# Patient Record
Sex: FEMALE | NOT HISPANIC OR LATINO | Employment: OTHER | URBAN - METROPOLITAN AREA
[De-identification: names, ages, dates, MRNs, and addresses within clinical notes are randomized per-mention and may not be internally consistent; named-entity substitution may affect disease eponyms.]

---

## 2023-10-11 ENCOUNTER — TELEPHONE (OUTPATIENT)
Dept: SURGERY | Facility: CLINIC | Age: 68
End: 2023-10-11
Payer: COMMERCIAL

## 2023-10-25 ENCOUNTER — LAB (OUTPATIENT)
Dept: LAB | Facility: LAB | Age: 68
End: 2023-10-25
Payer: COMMERCIAL

## 2023-10-25 ENCOUNTER — OFFICE VISIT (OUTPATIENT)
Dept: SURGERY | Facility: CLINIC | Age: 68
End: 2023-10-25
Payer: COMMERCIAL

## 2023-10-25 ENCOUNTER — CLINICAL SUPPORT (OUTPATIENT)
Dept: SURGERY | Facility: CLINIC | Age: 68
End: 2023-10-25
Payer: COMMERCIAL

## 2023-10-25 VITALS
HEART RATE: 81 BPM | HEIGHT: 58 IN | SYSTOLIC BLOOD PRESSURE: 112 MMHG | DIASTOLIC BLOOD PRESSURE: 66 MMHG | BODY MASS INDEX: 61.5 KG/M2 | WEIGHT: 293 LBS | RESPIRATION RATE: 16 BRPM

## 2023-10-25 DIAGNOSIS — E78.5 HYPERLIPIDEMIA, UNSPECIFIED: ICD-10-CM

## 2023-10-25 DIAGNOSIS — R79.1 ABNORMAL COAGULATION PROFILE: ICD-10-CM

## 2023-10-25 DIAGNOSIS — E07.9 DISORDER OF THYROID, UNSPECIFIED: ICD-10-CM

## 2023-10-25 DIAGNOSIS — E53.8 DEFICIENCY OF OTHER SPECIFIED B GROUP VITAMINS: Primary | ICD-10-CM

## 2023-10-25 DIAGNOSIS — E61.1 IRON DEFICIENCY: ICD-10-CM

## 2023-10-25 DIAGNOSIS — E55.9 VITAMIN D DEFICIENCY, UNSPECIFIED: ICD-10-CM

## 2023-10-25 DIAGNOSIS — E78.2 HYPERLIPIDEMIA, MIXED: ICD-10-CM

## 2023-10-25 DIAGNOSIS — G47.33 OBSTRUCTIVE SLEEP APNEA: ICD-10-CM

## 2023-10-25 DIAGNOSIS — E66.01 MORBID OBESITY DUE TO EXCESS CALORIES (MULTI): ICD-10-CM

## 2023-10-25 DIAGNOSIS — E11.9 TYPE 2 DIABETES MELLITUS WITHOUT COMPLICATION, WITHOUT LONG-TERM CURRENT USE OF INSULIN (MULTI): ICD-10-CM

## 2023-10-25 DIAGNOSIS — E63.9 NUTRITIONAL DEFICIENCY, UNSPECIFIED: ICD-10-CM

## 2023-10-25 DIAGNOSIS — I10 PRIMARY HYPERTENSION: Primary | ICD-10-CM

## 2023-10-25 LAB
25(OH)D3 SERPL-MCNC: 35 NG/ML (ref 31–100)
ALBUMIN SERPL-MCNC: 4 G/DL (ref 3.5–5)
ALP BLD-CCNC: 98 U/L (ref 35–125)
ALT SERPL-CCNC: 15 U/L (ref 5–40)
ANION GAP SERPL CALC-SCNC: 17 MMOL/L
APTT PPP: 30.1 SECONDS (ref 22–32.5)
AST SERPL-CCNC: 16 U/L (ref 5–40)
BASOPHILS # BLD AUTO: 0.09 X10*3/UL (ref 0–0.1)
BASOPHILS NFR BLD AUTO: 0.9 %
BILIRUB SERPL-MCNC: 0.4 MG/DL (ref 0.1–1.2)
BUN SERPL-MCNC: 14 MG/DL (ref 8–25)
CALCIUM SERPL-MCNC: 9.4 MG/DL (ref 8.5–10.4)
CHLORIDE SERPL-SCNC: 98 MMOL/L (ref 97–107)
CHOLEST SERPL-MCNC: 137 MG/DL (ref 133–200)
CHOLEST/HDLC SERPL: 2.4 {RATIO}
CO2 SERPL-SCNC: 24 MMOL/L (ref 24–31)
CREAT SERPL-MCNC: 0.8 MG/DL (ref 0.4–1.6)
EOSINOPHIL # BLD AUTO: 0.17 X10*3/UL (ref 0–0.7)
EOSINOPHIL NFR BLD AUTO: 1.7 %
ERYTHROCYTE [DISTWIDTH] IN BLOOD BY AUTOMATED COUNT: 13.3 % (ref 11.5–14.5)
FERRITIN SERPL-MCNC: 93 NG/ML (ref 13–150)
FOLATE SERPL-MCNC: >20 NG/ML (ref 4.2–19.9)
GFR SERPL CREATININE-BSD FRML MDRD: 82 ML/MIN/1.73M*2
GLUCOSE SERPL-MCNC: 198 MG/DL (ref 65–99)
HCT VFR BLD AUTO: 40.9 % (ref 36–46)
HDLC SERPL-MCNC: 56 MG/DL
HGB BLD-MCNC: 13.2 G/DL (ref 12–16)
IMM GRANULOCYTES # BLD AUTO: 0.04 X10*3/UL (ref 0–0.7)
IMM GRANULOCYTES NFR BLD AUTO: 0.4 % (ref 0–0.9)
INR PPP: 1.1 (ref 0.9–1.2)
IRON SATN MFR SERPL: 31 % (ref 12–50)
IRON SERPL-MCNC: 93 UG/DL (ref 30–160)
LDLC SERPL CALC-MCNC: 46 MG/DL (ref 65–130)
LYMPHOCYTES # BLD AUTO: 2.15 X10*3/UL (ref 1.2–4.8)
LYMPHOCYTES NFR BLD AUTO: 22.1 %
MCH RBC QN AUTO: 29.4 PG (ref 26–34)
MCHC RBC AUTO-ENTMCNC: 32.3 G/DL (ref 32–36)
MCV RBC AUTO: 91 FL (ref 80–100)
MONOCYTES # BLD AUTO: 0.59 X10*3/UL (ref 0.1–1)
MONOCYTES NFR BLD AUTO: 6.1 %
NEUTROPHILS # BLD AUTO: 6.69 X10*3/UL (ref 1.2–7.7)
NEUTROPHILS NFR BLD AUTO: 68.8 %
NRBC BLD-RTO: 0 /100 WBCS (ref 0–0)
PLATELET # BLD AUTO: 309 X10*3/UL (ref 150–450)
PMV BLD AUTO: 9.7 FL (ref 7.5–11.5)
POTASSIUM SERPL-SCNC: 4 MMOL/L (ref 3.4–5.1)
PROT SERPL-MCNC: 6.7 G/DL (ref 5.9–7.9)
PROTHROMBIN TIME: 11.2 SECONDS (ref 9.3–12.7)
PTH-INTACT SERPL-MCNC: 22.6 PG/ML (ref 18.5–88)
RBC # BLD AUTO: 4.49 X10*6/UL (ref 4–5.2)
SODIUM SERPL-SCNC: 139 MMOL/L (ref 133–145)
TIBC SERPL-MCNC: 300 UG/DL (ref 228–428)
TRIGL SERPL-MCNC: 177 MG/DL (ref 40–150)
TSH SERPL DL<=0.05 MIU/L-ACNC: 1.53 MIU/L (ref 0.27–4.2)
UIBC SERPL-MCNC: 207 UG/DL (ref 110–370)
VIT B12 SERPL-MCNC: 487 PG/ML (ref 211–946)
WBC # BLD AUTO: 9.7 X10*3/UL (ref 4.4–11.3)

## 2023-10-25 PROCEDURE — 80061 LIPID PANEL: CPT

## 2023-10-25 PROCEDURE — 82746 ASSAY OF FOLIC ACID SERUM: CPT

## 2023-10-25 PROCEDURE — 80053 COMPREHEN METABOLIC PANEL: CPT

## 2023-10-25 PROCEDURE — 84443 ASSAY THYROID STIM HORMONE: CPT

## 2023-10-25 PROCEDURE — 1036F TOBACCO NON-USER: CPT | Performed by: INTERNAL MEDICINE

## 2023-10-25 PROCEDURE — 82728 ASSAY OF FERRITIN: CPT

## 2023-10-25 PROCEDURE — 3074F SYST BP LT 130 MM HG: CPT | Performed by: INTERNAL MEDICINE

## 2023-10-25 PROCEDURE — 83970 ASSAY OF PARATHORMONE: CPT

## 2023-10-25 PROCEDURE — 85610 PROTHROMBIN TIME: CPT

## 2023-10-25 PROCEDURE — 82306 VITAMIN D 25 HYDROXY: CPT

## 2023-10-25 PROCEDURE — 83540 ASSAY OF IRON: CPT

## 2023-10-25 PROCEDURE — 36415 COLL VENOUS BLD VENIPUNCTURE: CPT

## 2023-10-25 PROCEDURE — 84446 ASSAY OF VITAMIN E: CPT

## 2023-10-25 PROCEDURE — 1125F AMNT PAIN NOTED PAIN PRSNT: CPT | Performed by: INTERNAL MEDICINE

## 2023-10-25 PROCEDURE — 3048F LDL-C <100 MG/DL: CPT | Performed by: INTERNAL MEDICINE

## 2023-10-25 PROCEDURE — 83550 IRON BINDING TEST: CPT

## 2023-10-25 PROCEDURE — 82525 ASSAY OF COPPER: CPT

## 2023-10-25 PROCEDURE — 1159F MED LIST DOCD IN RCRD: CPT | Performed by: INTERNAL MEDICINE

## 2023-10-25 PROCEDURE — 84630 ASSAY OF ZINC: CPT

## 2023-10-25 PROCEDURE — 3078F DIAST BP <80 MM HG: CPT | Performed by: INTERNAL MEDICINE

## 2023-10-25 PROCEDURE — 82607 VITAMIN B-12: CPT

## 2023-10-25 PROCEDURE — 84425 ASSAY OF VITAMIN B-1: CPT

## 2023-10-25 PROCEDURE — 85025 COMPLETE CBC W/AUTO DIFF WBC: CPT

## 2023-10-25 PROCEDURE — 4010F ACE/ARB THERAPY RXD/TAKEN: CPT | Performed by: INTERNAL MEDICINE

## 2023-10-25 PROCEDURE — 99213 OFFICE O/P EST LOW 20 MIN: CPT | Performed by: INTERNAL MEDICINE

## 2023-10-25 PROCEDURE — 85730 THROMBOPLASTIN TIME PARTIAL: CPT

## 2023-10-25 RX ORDER — LOSARTAN POTASSIUM 100 MG/1
100 TABLET ORAL
COMMUNITY
Start: 2023-02-01

## 2023-10-25 RX ORDER — MONTELUKAST SODIUM 10 MG/1
10 TABLET ORAL NIGHTLY
COMMUNITY
Start: 2023-02-01

## 2023-10-25 RX ORDER — OMEPRAZOLE 40 MG/1
40 CAPSULE, DELAYED RELEASE ORAL EVERY 24 HOURS
COMMUNITY

## 2023-10-25 RX ORDER — CYCLOBENZAPRINE HCL 10 MG
1 TABLET ORAL 3 TIMES DAILY PRN
COMMUNITY
Start: 2023-08-03

## 2023-10-25 RX ORDER — DULOXETIN HYDROCHLORIDE 60 MG/1
60 CAPSULE, DELAYED RELEASE ORAL
COMMUNITY
Start: 2023-02-01

## 2023-10-25 RX ORDER — NAPROXEN SODIUM 220 MG/1
81 TABLET, FILM COATED ORAL EVERY 24 HOURS
COMMUNITY
End: 2024-03-21 | Stop reason: HOSPADM

## 2023-10-25 RX ORDER — VIT C/E/ZN/COPPR/LUTEIN/ZEAXAN 250MG-90MG
1000 CAPSULE ORAL
COMMUNITY
End: 2024-03-21 | Stop reason: HOSPADM

## 2023-10-25 RX ORDER — ATORVASTATIN CALCIUM 10 MG/1
1 TABLET, FILM COATED ORAL NIGHTLY
COMMUNITY
Start: 2023-02-01

## 2023-10-25 RX ORDER — FLUOXETINE HYDROCHLORIDE 20 MG/1
20 CAPSULE ORAL
COMMUNITY
Start: 2023-03-21 | End: 2024-03-21 | Stop reason: HOSPADM

## 2023-10-25 RX ORDER — CALCIUM CARBONATE 500(1250)
1 TABLET ORAL
COMMUNITY
End: 2024-03-21 | Stop reason: HOSPADM

## 2023-10-25 RX ORDER — METFORMIN HYDROCHLORIDE 500 MG/1
1 TABLET ORAL
COMMUNITY
Start: 2023-02-01

## 2023-10-25 RX ORDER — ALBUTEROL SULFATE 90 UG/1
2 AEROSOL, METERED RESPIRATORY (INHALATION) EVERY 6 HOURS PRN
COMMUNITY
Start: 2023-02-01

## 2023-10-25 ASSESSMENT — ENCOUNTER SYMPTOMS
ARTHRALGIAS: 0
DIARRHEA: 0
NAUSEA: 0
LOSS OF SENSATION IN FEET: 0
DEPRESSION: 0
CONSTIPATION: 0
DIZZINESS: 0
ABDOMINAL PAIN: 0
OCCASIONAL FEELINGS OF UNSTEADINESS: 0
PALPITATIONS: 0
COUGH: 0
SHORTNESS OF BREATH: 0

## 2023-10-25 ASSESSMENT — PATIENT HEALTH QUESTIONNAIRE - PHQ9
2. FEELING DOWN, DEPRESSED OR HOPELESS: NOT AT ALL
1. LITTLE INTEREST OR PLEASURE IN DOING THINGS: NOT AT ALL
SUM OF ALL RESPONSES TO PHQ9 QUESTIONS 1 AND 2: 0

## 2023-10-25 ASSESSMENT — PAIN SCALES - GENERAL: PAINLEVEL: 7

## 2023-10-25 NOTE — PROGRESS NOTES
Medical Weight Loss Appointment ([facility] 3/3)     Current Weight: 311lb  Current BMI: 64.44  20 lb wt loss goal (300lb)  9 lb. wt loss this past month     Current Diet: partially adhering to lifelong rules.   Adherence: good  Daily Intake: 2-3 meals/day.   Breakfast (@10 am)- 1-2 scrambled eggs with low-fat cheese.   Pt reports sometimes being too full from breakfast and then wanting to skip lunch.   Lunch (@3pm)- turkey sandwich with a little bit of mustard, OR [employer] broccoli cheddar soup, OR ratio protein greek yogurt, OR skips (1-2x/week).   Dinner- grill chicken and coats beans, OR 1 pork chop with mixed vegetables, OR lean cut steak with vegetables   Snacks: bowl of cereal 1x this past month  Beverages: water, 2 cups of coffee/day with 2% milk or chocolate creamer   Exercise: Chair exercises 2-3x/day, everyday   Behavior/Diet Changes:  - Stopped snacking at night  - Decreased starch intake  - Decreased cereal intake   - Tried protein shake/ sf creamer in coffee: pt reports she did not like it.     Today's Lesson: Reviewed patient's dietary changes and food recall. Reviewed milk alternatives such as choosing skim or 1%. Reviewed low-sugar suggestions for coffee including Fairlife fat-free milk or tbsp of Nestle cocoa mix.   Diet Goals: Practice the lifelong rules  Exercise Recommendations: Gradually work up to 150 minutes of moderate intensity exercise per week.  Behavior Goals:   1. Decrease breakfast to 1 egg and 1/4 cup low-fat cheese OR 2 eggs, no cheese to prevent overeating.   2. Incorporate lunch consistently.   3. Continue to decrease creamer use. Try low-fat milk and/or sugar-free cocoa mix.     Plan: dietary clearance not provided today. Pt will fuv with dietitian next month.     Keiry Herndon, RD, LDN  Registered Dietitian, Licensed Dietitian Nutritionist

## 2023-10-25 NOTE — PROGRESS NOTES
"Subjective   Patient ID: Nisa Guzman is a 67 y.o. female who presents for medical weight loss visit. Currently has 3 meals a day, protein with each meal. Does not snack. Does not drink sugary beverages. Regarding exercise, she completes chair exercises 3x a day. She is limited due to her hip pain. She has an appointment with the cardiologist next week. Her insurance did not cover a CPAP and patient does not think she would tolerate one. Agreed to use hospital APAP and to sleep in recliner after surgery.    Diagnostics Reviewed: 10/2023 sleep study showed mild sleep apnea.   Labs Reviewed:         Review of Systems   Respiratory:  Negative for cough and shortness of breath.    Cardiovascular:  Negative for chest pain and palpitations.   Gastrointestinal:  Negative for abdominal pain, constipation, diarrhea and nausea.   Musculoskeletal:  Negative for arthralgias.   Neurological:  Negative for dizziness.       Objective   /66   Pulse 81   Resp 16   Ht 1.48 m (4' 10.25\")   Wt 141 kg (311 lb)   BMI 64.44 kg/m²     Physical Exam  Constitutional:       Appearance: She is obese.   HENT:      Mouth/Throat:      Comments: Dentition ok  Cardiovascular:      Rate and Rhythm: Normal rate and regular rhythm.   Pulmonary:      Breath sounds: Normal breath sounds.   Abdominal:      General: Bowel sounds are normal.      Palpations: Abdomen is soft.   Musculoskeletal:         General: No swelling.   Neurological:      Mental Status: She is alert.         Assessment/Plan   Diagnoses and all orders for this visit:  Primary hypertension  Hyperlipidemia, mixed  Obstructive sleep apnea  Type 2 diabetes mellitus without complication, without long-term current use of insulin (CMS/[facility])  Morbid obesity due to excess calories (CMS/[facility])        Scribe Attestation  By signing my name below, Elaine CEDEÑO, Scrpaul attest that this documentation has been prepared under the direction and in the presence of Haven Alejandro, " MD.

## 2023-10-27 ENCOUNTER — TELEPHONE (OUTPATIENT)
Dept: SURGERY | Facility: CLINIC | Age: 68
End: 2023-10-27
Payer: COMMERCIAL

## 2023-10-27 LAB
COPPER SERPL-MCNC: 121.8 UG/DL (ref 80–155)
ZINC SERPL-MCNC: 87.3 UG/DL (ref 60–120)

## 2023-10-28 LAB
A-TOCOPHEROL VIT E SERPL-MCNC: 23.7 MG/L (ref 5.5–18)
BETA+GAMMA TOCOPHEROL SERPL-MCNC: 0.3 MG/L (ref 0–6)

## 2023-10-29 LAB — VIT B1 PYROPHOSHATE BLD-SCNC: 251 NMOL/L (ref 70–180)

## 2023-10-31 ENCOUNTER — OFFICE VISIT (OUTPATIENT)
Dept: CARDIOLOGY | Facility: CLINIC | Age: 68
End: 2023-10-31
Payer: COMMERCIAL

## 2023-10-31 VITALS
WEIGHT: 293 LBS | SYSTOLIC BLOOD PRESSURE: 164 MMHG | OXYGEN SATURATION: 98 % | BODY MASS INDEX: 65.27 KG/M2 | HEART RATE: 107 BPM | DIASTOLIC BLOOD PRESSURE: 88 MMHG

## 2023-10-31 DIAGNOSIS — I10 ESSENTIAL HYPERTENSION: ICD-10-CM

## 2023-10-31 DIAGNOSIS — Z01.810 PREOP CARDIOVASCULAR EXAM: Primary | ICD-10-CM

## 2023-10-31 DIAGNOSIS — E78.49 OTHER HYPERLIPIDEMIA: ICD-10-CM

## 2023-10-31 DIAGNOSIS — Z01.818 PRE-OPERATIVE CLEARANCE: ICD-10-CM

## 2023-10-31 PROCEDURE — 1036F TOBACCO NON-USER: CPT | Performed by: INTERNAL MEDICINE

## 2023-10-31 PROCEDURE — 1125F AMNT PAIN NOTED PAIN PRSNT: CPT | Performed by: INTERNAL MEDICINE

## 2023-10-31 PROCEDURE — 3077F SYST BP >= 140 MM HG: CPT | Performed by: INTERNAL MEDICINE

## 2023-10-31 PROCEDURE — 93010 ELECTROCARDIOGRAM REPORT: CPT | Performed by: INTERNAL MEDICINE

## 2023-10-31 PROCEDURE — 3079F DIAST BP 80-89 MM HG: CPT | Performed by: INTERNAL MEDICINE

## 2023-10-31 PROCEDURE — 99204 OFFICE O/P NEW MOD 45 MIN: CPT | Performed by: INTERNAL MEDICINE

## 2023-10-31 PROCEDURE — 1159F MED LIST DOCD IN RCRD: CPT | Performed by: INTERNAL MEDICINE

## 2023-10-31 PROCEDURE — 99214 OFFICE O/P EST MOD 30 MIN: CPT | Performed by: INTERNAL MEDICINE

## 2023-10-31 PROCEDURE — 93005 ELECTROCARDIOGRAM TRACING: CPT | Performed by: INTERNAL MEDICINE

## 2023-10-31 RX ORDER — AMLODIPINE BESYLATE 5 MG/1
5 TABLET ORAL DAILY
Qty: 30 TABLET | Refills: 11 | Status: SHIPPED | OUTPATIENT
Start: 2023-10-31 | End: 2024-01-23 | Stop reason: DRUGHIGH

## 2023-10-31 ASSESSMENT — ENCOUNTER SYMPTOMS
WEAKNESS: 0
LOSS OF SENSATION IN FEET: 0
OCCASIONAL FEELINGS OF UNSTEADINESS: 0
SYNCOPE: 0
MYALGIAS: 0
CLAUDICATION: 0
DEPRESSION: 0
COUGH: 0
PND: 0
DIZZINESS: 0
DIAPHORESIS: 0
WEIGHT GAIN: 0
DYSPNEA ON EXERTION: 0
FEVER: 0
WHEEZING: 0
ORTHOPNEA: 0
WEIGHT LOSS: 0
NEAR-SYNCOPE: 0
SHORTNESS OF BREATH: 0
PALPITATIONS: 0
IRREGULAR HEARTBEAT: 0

## 2023-10-31 ASSESSMENT — PAIN SCALES - GENERAL: PAINLEVEL: 7

## 2023-10-31 NOTE — ASSESSMENT & PLAN NOTE
Uncontrolled. Adding Norvasc. Will see in 3m to reassess. Encouraged to check at home 2-3x/week. Discussed lifestyle modifications. Discussed the DASH diet

## 2023-10-31 NOTE — PROGRESS NOTES
Subjective      Chief Complaint   Patient presents with    Pre-op Clearance     Bariatric surgery clearance        67-year-old female with history of hypertension, hyperlipidemia, non-insulin-dependent type 2 diabetes mellitus and morbid obesity presents for preoperative cardiac restratification for bariatric surgery. She has seen Dr Graham Wang and Dr Alejandro three times. She is currently active in the weight loss program. She walks up and down her driveway but is limited by her back pain. She has been doing chair exercises without limitation.          Review of Systems   Constitutional: Negative for diaphoresis, fever, weight gain and weight loss.   Eyes:  Negative for visual disturbance.   Cardiovascular:  Negative for chest pain, claudication, dyspnea on exertion, irregular heartbeat, leg swelling, near-syncope, orthopnea, palpitations, paroxysmal nocturnal dyspnea and syncope.   Respiratory:  Negative for cough, shortness of breath and wheezing.    Musculoskeletal:  Negative for muscle weakness and myalgias.   Neurological:  Negative for dizziness and weakness.   All other systems reviewed and are negative.       Past Medical History:   Diagnosis Date    Hyperlipidemia     Hypertension     Sleep apnea         History reviewed. No pertinent surgical history.     Social History     Socioeconomic History    Marital status:      Spouse name: Not on file    Number of children: Not on file    Years of education: Not on file    Highest education level: Not on file   Occupational History    Not on file   Tobacco Use    Smoking status: Never     Passive exposure: Never    Smokeless tobacco: Never   Vaping Use    Vaping Use: Never used   Substance and Sexual Activity    Alcohol use: Not Currently    Drug use: Never    Sexual activity: Defer   Other Topics Concern    Not on file   Social History Narrative    Not on file     Social Determinants of Health     Financial Resource Strain: Not on file   Food Insecurity: Not  on file   Transportation Needs: Not on file   Physical Activity: Not on file   Stress: Not on file   Social Connections: Not on file   Intimate Partner Violence: Not on file   Housing Stability: Not on file        Family History   Problem Relation Name Age of Onset    Obesity Mother          morbid    Diabetes Mother      Hypertension Mother      Lung cancer Mother      Hypertension Father      Melanoma Father      Stroke Father      Heart attack Father          OBJECTIVE:    Vitals:    10/31/23 1327   BP: 164/88   Pulse: 107   SpO2: 98%        Vitals reviewed.   Constitutional:       Appearance: Normal and healthy appearance. Not in distress.   Pulmonary:      Effort: Pulmonary effort is normal.      Breath sounds: Normal breath sounds.   Cardiovascular:      Normal rate. Regular rhythm. Normal S1. Normal S2.       Murmurs: There is no murmur.      No gallop.  No click.   Pulses:     Intact distal pulses.   Edema:     Peripheral edema absent.   Skin:     General: Skin is warm and dry.   Neurological:      General: No focal deficit present.          Lab Review:   Lab Results   Component Value Date     10/25/2023    K 4.0 10/25/2023    CL 98 10/25/2023    CO2 24 10/25/2023    BUN 14 10/25/2023    CREATININE 0.80 10/25/2023    GLUCOSE 198 (H) 10/25/2023    CALCIUM 9.4 10/25/2023     Lab Results   Component Value Date    CHOL 137 10/25/2023    TRIG 177 (H) 10/25/2023    HDL 56.0 10/25/2023       Lab Results   Component Value Date    LDLCALC 46 (L) 10/25/2023        Essential hypertension  Uncontrolled. Adding Norvasc. Will see in 3m to reassess. Encouraged to check at home 2-3x/week. Discussed lifestyle modifications. Discussed the DASH diet    Preop cardiovascular exam  The patient is of low risk for a surgical procedure that carries an inherent intermediate risk of adverse cardiac events. In the absence of acute coronary syndrome, acutely decompensated heart failure, hemodynamically significant valvular  disease, or malignant arrhythmia I would consider this risk acceptable to proceed without further cardiac workup or intervention.       Other hyperlipidemia  Lipid panel from this month reviewed with her, excellent

## 2023-10-31 NOTE — ASSESSMENT & PLAN NOTE
The patient is of low risk for a surgical procedure that carries an inherent intermediate risk of adverse cardiac events. In the absence of acute coronary syndrome, acutely decompensated heart failure, hemodynamically significant valvular disease, or malignant arrhythmia I would consider this risk acceptable to proceed without further cardiac workup or intervention.

## 2023-11-21 ENCOUNTER — NUTRITION (OUTPATIENT)
Dept: SURGERY | Facility: CLINIC | Age: 68
End: 2023-11-21
Payer: COMMERCIAL

## 2023-11-21 ENCOUNTER — OFFICE VISIT (OUTPATIENT)
Dept: SURGERY | Facility: CLINIC | Age: 68
End: 2023-11-21
Payer: COMMERCIAL

## 2023-11-21 VITALS
HEART RATE: 99 BPM | BODY MASS INDEX: 61.5 KG/M2 | RESPIRATION RATE: 16 BRPM | DIASTOLIC BLOOD PRESSURE: 70 MMHG | SYSTOLIC BLOOD PRESSURE: 156 MMHG | HEIGHT: 58 IN | WEIGHT: 293 LBS

## 2023-11-21 VITALS — WEIGHT: 293 LBS | BODY MASS INDEX: 64.86 KG/M2

## 2023-11-21 DIAGNOSIS — E66.01 MORBID OBESITY DUE TO EXCESS CALORIES (MULTI): ICD-10-CM

## 2023-11-21 DIAGNOSIS — G47.33 OBSTRUCTIVE SLEEP APNEA: ICD-10-CM

## 2023-11-21 DIAGNOSIS — E11.9 TYPE 2 DIABETES MELLITUS WITHOUT COMPLICATION, WITHOUT LONG-TERM CURRENT USE OF INSULIN (MULTI): ICD-10-CM

## 2023-11-21 DIAGNOSIS — I10 PRIMARY HYPERTENSION: Primary | ICD-10-CM

## 2023-11-21 DIAGNOSIS — E63.9 NUTRITIONAL DEFICIENCY: ICD-10-CM

## 2023-11-21 PROCEDURE — 1126F AMNT PAIN NOTED NONE PRSNT: CPT | Performed by: INTERNAL MEDICINE

## 2023-11-21 PROCEDURE — 4010F ACE/ARB THERAPY RXD/TAKEN: CPT | Performed by: INTERNAL MEDICINE

## 2023-11-21 PROCEDURE — 1036F TOBACCO NON-USER: CPT | Performed by: INTERNAL MEDICINE

## 2023-11-21 PROCEDURE — 3078F DIAST BP <80 MM HG: CPT | Performed by: INTERNAL MEDICINE

## 2023-11-21 PROCEDURE — 99213 OFFICE O/P EST LOW 20 MIN: CPT | Performed by: INTERNAL MEDICINE

## 2023-11-21 PROCEDURE — 3077F SYST BP >= 140 MM HG: CPT | Performed by: INTERNAL MEDICINE

## 2023-11-21 PROCEDURE — 3048F LDL-C <100 MG/DL: CPT | Performed by: INTERNAL MEDICINE

## 2023-11-21 PROCEDURE — 1159F MED LIST DOCD IN RCRD: CPT | Performed by: INTERNAL MEDICINE

## 2023-11-21 ASSESSMENT — ENCOUNTER SYMPTOMS
SHORTNESS OF BREATH: 0
COUGH: 0
NAUSEA: 0
DEPRESSION: 0
ARTHRALGIAS: 1
DIZZINESS: 0
PALPITATIONS: 0
OCCASIONAL FEELINGS OF UNSTEADINESS: 0
DIARRHEA: 0
CONSTIPATION: 0
ABDOMINAL PAIN: 0
BACK PAIN: 1
LOSS OF SENSATION IN FEET: 0

## 2023-11-21 ASSESSMENT — PATIENT HEALTH QUESTIONNAIRE - PHQ9
1. LITTLE INTEREST OR PLEASURE IN DOING THINGS: NOT AT ALL
SUM OF ALL RESPONSES TO PHQ9 QUESTIONS 1 AND 2: 0
2. FEELING DOWN, DEPRESSED OR HOPELESS: NOT AT ALL

## 2023-11-21 ASSESSMENT — PAIN SCALES - GENERAL: PAINLEVEL: 0-NO PAIN

## 2023-11-21 NOTE — PROGRESS NOTES
"Subjective   Patient ID: Nisa Guzman is a 67 y.o. female who presents for medical weight loss visit 4/3. Currently has 3 meals a day, protein with each meal. Drinks 2 cups of coffee w/ sf creamer and water. Willing to try CPAP which was ordered by her PCP. Received cardiac clearance from Dr. Hernandes. Endorses neck pain and shoulder pain.    Diagnostics Reviewed: 10/2023 EKG NSR. 10/2023 sleep study showed mild sleep apnea.   Labs Reviewed: 10/2023 labs WNL. Need A1c. 10/2022 A1c 7.3         Review of Systems   Respiratory:  Negative for cough and shortness of breath.    Cardiovascular:  Negative for chest pain and palpitations.   Gastrointestinal:  Negative for abdominal pain, constipation, diarrhea and nausea.   Musculoskeletal:  Positive for arthralgias (shoulder pain) and back pain.   Neurological:  Negative for dizziness.       Objective   /70   Pulse 99   Resp 16   Ht 1.48 m (4' 10.25\")   Wt 142 kg (313 lb)   BMI 64.86 kg/m²     Physical Exam  Constitutional:       Appearance: She is obese.   HENT:      Mouth/Throat:      Comments: Dentition ok  Cardiovascular:      Rate and Rhythm: Normal rate and regular rhythm.   Pulmonary:      Breath sounds: Normal breath sounds.   Abdominal:      General: Bowel sounds are normal.      Palpations: Abdomen is soft.   Musculoskeletal:         General: No swelling.   Neurological:      Mental Status: She is alert.         Assessment/Plan   Diagnoses and all orders for this visit:  Type 2 diabetes mellitus without complication, without long-term current use of insulin (CMS/[facility])  -     Hemoglobin A1C; Future  Nutritional deficiency  -     Vitamin A; Future          Scribe Attestation  By signing my name below, IElaine Scribe attest that this documentation has been prepared under the direction and in the presence of Haven Alejandro MD.    "

## 2023-11-21 NOTE — PROGRESS NOTES
Medical Weight Loss Appointment ([facility] 4/3)     Current Weight: 313 lb / 2 lb wt gain this past month  Current BMI: 64.86  20 lb wt loss goal (300lb)    Current Diet: partially adhering to lifelong rules   Adherence: fair to good  Daily Intake: 3 meals with snacking   Breakfast- cereal with Fairlife fat-free milk, OR ratio protein greek yogurt (25g), OR fat-free cottage cheese with fruit   Lunch- cheeseburger with no top bun today, OR broccoli cheese soup 3x this past month  Dinner- meat (pork chop/chicken/turkey sausage) and a vegetable, OR chinese food last night   Snacks: popcorn, ratio protein greek yogurt, fat-free cottage cheese, 1 piece of halloween candy  Beverages: coffee with sf creamer, water, diet coke, fat-free Fairlife milk   Exercise: chair exercises 3x/day.   Behavior/Diet Changes:  - Switched to a sugar-free creamer    Pt reports feeling hunger at night. We reviewed increasing protein intake for satiety, and higher protein snacks.     Pt reports that she feels more down in the November/December months. We reviewed the possible benefit of seeking help via therapy during these months.     Today's Lesson: Reviewed patient's dietary changes and food recall. Provided handout for building a healthy plate. Provided fruit handout. Provided healthy snack handout. Reviewed protein intake and satiety.   Diet Goals: Practice the lifelong rules  Exercise Recommendations: Gradually work up to 150 minutes of moderate intensity exercise per week.  Behavior Goals:   1. Measure out fruit to portion.   2. Increase lean protein intake at meals.    Plan: Will follow up next month. Will continue to monitor pt's weight, dietary & behavior changes.       Keiry Herndon, RD, LDN  Registered Dietitian, Licensed Dietitian Nutritionist

## 2023-11-22 ENCOUNTER — APPOINTMENT (OUTPATIENT)
Dept: SURGERY | Facility: CLINIC | Age: 68
End: 2023-11-22
Payer: COMMERCIAL

## 2023-12-11 NOTE — PROGRESS NOTES
Medical Weight Loss Appointment ([facility])    Virtual appt. Pt reports she does not have a scale at home, thus unable to obtain today's weight.     Current Diet: practicing lifelong rules   Adherence: good per pt.  Daily Intake:   Breakfast- a protein shake, OR an [employer]ik[employer] greek yogurt (15g protein) and a low-fat cheese stick, OR 1/2 cup cottage cheese with added D[employer] fruit in water package   Lunch- same options as breakfast.   Dinner- meat and vegetables. Examples include chicken, pork chops, and lean steak  Snacks: mandarin oranges, or a greek yogurt   Beverages: water, 2 cups of coffee/day with sf creamer, sprite zero 1x this past month  Exercise: chair exercises 3-4x/day for 45 minutes to 1 hour. Pt also reports walking the entire store which is an activity she was not able to do until more recently.   Behavior/Diet Changes:  - Bought measuring cups/spoons and is using a smaller plate.   - Incorporating lean protein choices. Pt reports consuming at least 20 grams of protein/meal.     Today's Lesson: Reviewed patient's dietary changes and food recall. Reviewed Sprite Zero intake.   Diet Goals: Practice the lifelong rules  Exercise Recommendations: Gradually work up to 150 minutes of moderate intensity exercise per week.  Behavior Goals:   1. Maintain dietary and behavior changes   2. Continue to maintain wt loss.       Plan: Will follow up next month. Will continue to monitor pt's weight, dietary & behavior changes.         Keiry Herndon RD, LDN  Registered Dietitian, Licensed Dietitian Nutritionist

## 2023-12-12 ENCOUNTER — TELEPHONE (OUTPATIENT)
Dept: SURGERY | Facility: CLINIC | Age: 68
End: 2023-12-12

## 2023-12-12 ENCOUNTER — TELEMEDICINE (OUTPATIENT)
Dept: SURGERY | Facility: CLINIC | Age: 68
End: 2023-12-12
Payer: COMMERCIAL

## 2023-12-12 ENCOUNTER — APPOINTMENT (OUTPATIENT)
Dept: SURGERY | Facility: CLINIC | Age: 68
End: 2023-12-12
Payer: COMMERCIAL

## 2023-12-12 VITALS — HEIGHT: 58 IN | BODY MASS INDEX: 64.86 KG/M2

## 2023-12-12 DIAGNOSIS — E11.9 TYPE 2 DIABETES MELLITUS WITHOUT COMPLICATION, WITHOUT LONG-TERM CURRENT USE OF INSULIN (MULTI): ICD-10-CM

## 2023-12-12 DIAGNOSIS — E78.2 HYPERLIPIDEMIA, MIXED: ICD-10-CM

## 2023-12-12 DIAGNOSIS — G47.33 OBSTRUCTIVE SLEEP APNEA: ICD-10-CM

## 2023-12-12 DIAGNOSIS — E66.01 MORBID OBESITY DUE TO EXCESS CALORIES (MULTI): ICD-10-CM

## 2023-12-12 DIAGNOSIS — I10 PRIMARY HYPERTENSION: Primary | ICD-10-CM

## 2023-12-12 PROCEDURE — 99213 OFFICE O/P EST LOW 20 MIN: CPT | Performed by: INTERNAL MEDICINE

## 2023-12-12 ASSESSMENT — PAIN SCALES - GENERAL: PAINLEVEL: 4

## 2023-12-12 ASSESSMENT — ENCOUNTER SYMPTOMS
ARTHRALGIAS: 0
COUGH: 0
NAUSEA: 0
DEPRESSION: 0
SHORTNESS OF BREATH: 0
ABDOMINAL PAIN: 0
OCCASIONAL FEELINGS OF UNSTEADINESS: 0
PALPITATIONS: 0
CONSTIPATION: 0
DIZZINESS: 0
DIARRHEA: 0
LOSS OF SENSATION IN FEET: 0

## 2023-12-12 NOTE — PROGRESS NOTES
"Subjective   Patient ID: Nisa Guzman is a 67 y.o. female who presents on the phone for medical weight loss visit 5. Currently has 3 meals a day, protein with each meal. She eats mostly meat and vegetables for dinner. Rarely snacks. She completes chair exercises. Has not received her CPAP yet. Has cardiac clearance from Dr. Hernandes.    Diagnostics Reviewed: 10/2023 EKG NSR. 10/2023 sleep study showed mild sleep apnea.   Labs Reviewed: 10/2023 labs WNL. Need A1c. 10/2022 A1c 7.3         Review of Systems   Respiratory:  Negative for cough and shortness of breath.    Cardiovascular:  Negative for chest pain and palpitations.   Gastrointestinal:  Negative for abdominal pain, constipation, diarrhea and nausea.   Musculoskeletal:  Negative for arthralgias.   Neurological:  Negative for dizziness.       Objective   Ht 1.48 m (4' 10.25\")   BMI 64.86 kg/m²     Physical Exam  Neurological:      Mental Status: She is alert and oriented to person, place, and time.   Psychiatric:         Mood and Affect: Mood normal.         Behavior: Behavior normal.         Assessment/Plan   There are no diagnoses linked to this encounter.      Scribe Attestation  By signing my name below, IElaine, Scrpaul   attest that this documentation has been prepared under the direction and in the presence of Haven Alejandro MD.  "

## 2023-12-20 ENCOUNTER — APPOINTMENT (OUTPATIENT)
Dept: SURGERY | Facility: CLINIC | Age: 68
End: 2023-12-20
Payer: COMMERCIAL

## 2024-01-08 NOTE — PROGRESS NOTES
Medical Weight Loss Appointment ([facility])    Current Weight: 303 lb / this reflects a 10 lb wt loss x 2 months.   Current BMI: 62.78  Weight loss goal per Dr. Barry: 300lb    Current Diet: practicing lifelong rules   Adherence: good  Daily Intake: 3 meals, minimal snacking  Breakfast- usually 2 eggs, OR 1 egg with added cheese. Pt reports having cereal 2x this past month with low-fat Fairlife milk.   Lunch- greek yogurt and a cheese stick   Dinner - a meat (ex: pork chop, chicken, ground turkey) paired with a vegetable (usually brussels sprouts per pt).   Snacks: Skinnypop popcorn   Pt reports she eats dinner at 6 pm and does not go to bed until 11 pm. Pt reports she snacks on popcorn between dinner/sleep due to feeling physically hungry.   Beverages: a lot of water, 1 cup of coffee/day with sf creamer   Exercise: chair exercises 3x/day.   Sleep: pt reports a decline in sleep due to nightmares. Pt reports sleeping maybe 4 hours per night, sometimes 2.   Behavior/Diet Changes:  - Pt reports focusing on her protein intake and a vegetable.   - Eliminated all soda and juice.     Today's Lesson: Reviewed patient's dietary changes and food recall. Provided and reviewed healthier snack choices.   Diet Goals: Practice the lifelong rules  Exercise Recommendations: Gradually work up to 150 minutes of moderate intensity exercise per week.  Behavior Goals:   1. Continue to maintain wt loss and dietary choices.  2. Decrease popcorn intake and/or make healthier choice: high in protein.     Plan: Will follow up next month. Will continue to monitor pt's weight, dietary & behavior changes.       Keiry Herndon RD, LDN  Registered Dietitian, Licensed Dietitian Nutritionist

## 2024-01-11 ENCOUNTER — NUTRITION (OUTPATIENT)
Dept: SURGERY | Facility: CLINIC | Age: 69
End: 2024-01-11
Payer: COMMERCIAL

## 2024-01-11 ENCOUNTER — OFFICE VISIT (OUTPATIENT)
Dept: SURGERY | Facility: CLINIC | Age: 69
End: 2024-01-11
Payer: COMMERCIAL

## 2024-01-11 VITALS
WEIGHT: 293 LBS | BODY MASS INDEX: 61.5 KG/M2 | HEART RATE: 99 BPM | SYSTOLIC BLOOD PRESSURE: 143 MMHG | DIASTOLIC BLOOD PRESSURE: 72 MMHG | HEIGHT: 58 IN | RESPIRATION RATE: 16 BRPM

## 2024-01-11 DIAGNOSIS — I10 PRIMARY HYPERTENSION: ICD-10-CM

## 2024-01-11 DIAGNOSIS — E66.01 MORBID OBESITY DUE TO EXCESS CALORIES (MULTI): ICD-10-CM

## 2024-01-11 DIAGNOSIS — E11.9 TYPE 2 DIABETES MELLITUS WITHOUT COMPLICATION, WITHOUT LONG-TERM CURRENT USE OF INSULIN (MULTI): ICD-10-CM

## 2024-01-11 DIAGNOSIS — E78.2 HYPERLIPIDEMIA, MIXED: ICD-10-CM

## 2024-01-11 DIAGNOSIS — G47.33 OBSTRUCTIVE SLEEP APNEA: Primary | ICD-10-CM

## 2024-01-11 PROCEDURE — 1159F MED LIST DOCD IN RCRD: CPT | Performed by: INTERNAL MEDICINE

## 2024-01-11 PROCEDURE — 3077F SYST BP >= 140 MM HG: CPT | Performed by: INTERNAL MEDICINE

## 2024-01-11 PROCEDURE — 3078F DIAST BP <80 MM HG: CPT | Performed by: INTERNAL MEDICINE

## 2024-01-11 PROCEDURE — 1125F AMNT PAIN NOTED PAIN PRSNT: CPT | Performed by: INTERNAL MEDICINE

## 2024-01-11 PROCEDURE — 1036F TOBACCO NON-USER: CPT | Performed by: INTERNAL MEDICINE

## 2024-01-11 PROCEDURE — 4010F ACE/ARB THERAPY RXD/TAKEN: CPT | Performed by: INTERNAL MEDICINE

## 2024-01-11 PROCEDURE — 99213 OFFICE O/P EST LOW 20 MIN: CPT | Performed by: INTERNAL MEDICINE

## 2024-01-11 ASSESSMENT — ENCOUNTER SYMPTOMS
ARTHRALGIAS: 0
ABDOMINAL PAIN: 0
DEPRESSION: 0
SHORTNESS OF BREATH: 0
DIZZINESS: 0
NAUSEA: 0
DIARRHEA: 0
COUGH: 0
LOSS OF SENSATION IN FEET: 0
OCCASIONAL FEELINGS OF UNSTEADINESS: 0
CONSTIPATION: 0
PALPITATIONS: 0

## 2024-01-11 ASSESSMENT — PAIN SCALES - GENERAL: PAINLEVEL: 4

## 2024-01-11 NOTE — PROGRESS NOTES
"Subjective   Patient ID: Nisa Guzman is a 68 y.o. female who presents for [facility] visit 6. Currently has 3 meals a day, protein with each meal. Rarely snacks on popcorn or yogurt when she feels hungry. Drinks only water and coffee. Regarding exercise, she completes chair exercises. Has been trying to receive CPAP since October. Will call again.    Diagnostics Reviewed: 10/2023 EKG NSR. 10/2023 sleep study showed mild sleep apnea.   Labs Reviewed: 10/2023 labs WNL. Need A1c. 10/2022 A1c 7.3 Still need vit A and A1c.         Review of Systems   Respiratory:  Negative for cough and shortness of breath.    Cardiovascular:  Negative for chest pain and palpitations.   Gastrointestinal:  Negative for abdominal pain, constipation, diarrhea and nausea.   Musculoskeletal:  Negative for arthralgias.   Neurological:  Negative for dizziness.       Objective   /72   Pulse 99   Resp 16   Ht 1.48 m (4' 10.25\")   Wt 137 kg (303 lb)   BMI 62.78 kg/m²     Physical Exam  Constitutional:       Appearance: She is obese.   HENT:      Mouth/Throat:      Comments: Dentition ok  Cardiovascular:      Rate and Rhythm: Normal rate and regular rhythm.   Pulmonary:      Breath sounds: Normal breath sounds.   Abdominal:      General: Bowel sounds are normal.      Palpations: Abdomen is soft.   Musculoskeletal:         General: No swelling.   Neurological:      Mental Status: She is alert.         Assessment/Plan   There are no diagnoses linked to this encounter.      Scribe Attestation  By signing my name below, IElaine, Scribmallika   attest that this documentation has been prepared under the direction and in the presence of Haven Alejandro MD.  "

## 2024-01-23 ENCOUNTER — OFFICE VISIT (OUTPATIENT)
Dept: CARDIOLOGY | Facility: CLINIC | Age: 69
End: 2024-01-23
Payer: COMMERCIAL

## 2024-01-23 VITALS
WEIGHT: 293 LBS | OXYGEN SATURATION: 96 % | DIASTOLIC BLOOD PRESSURE: 70 MMHG | HEART RATE: 88 BPM | BODY MASS INDEX: 64.03 KG/M2 | SYSTOLIC BLOOD PRESSURE: 142 MMHG

## 2024-01-23 DIAGNOSIS — I10 ESSENTIAL HYPERTENSION: Primary | ICD-10-CM

## 2024-01-23 DIAGNOSIS — E78.49 OTHER HYPERLIPIDEMIA: ICD-10-CM

## 2024-01-23 PROCEDURE — 99213 OFFICE O/P EST LOW 20 MIN: CPT | Performed by: INTERNAL MEDICINE

## 2024-01-23 PROCEDURE — 1159F MED LIST DOCD IN RCRD: CPT | Performed by: INTERNAL MEDICINE

## 2024-01-23 PROCEDURE — 3078F DIAST BP <80 MM HG: CPT | Performed by: INTERNAL MEDICINE

## 2024-01-23 PROCEDURE — 1036F TOBACCO NON-USER: CPT | Performed by: INTERNAL MEDICINE

## 2024-01-23 PROCEDURE — 1125F AMNT PAIN NOTED PAIN PRSNT: CPT | Performed by: INTERNAL MEDICINE

## 2024-01-23 PROCEDURE — 3077F SYST BP >= 140 MM HG: CPT | Performed by: INTERNAL MEDICINE

## 2024-01-23 RX ORDER — AMLODIPINE BESYLATE 5 MG/1
5 TABLET ORAL 2 TIMES DAILY
Qty: 60 TABLET | Refills: 11 | Status: SHIPPED | OUTPATIENT
Start: 2024-01-23 | End: 2024-01-23 | Stop reason: SDUPTHER

## 2024-01-23 RX ORDER — AMLODIPINE BESYLATE 5 MG/1
5 TABLET ORAL 2 TIMES DAILY
Qty: 180 TABLET | Refills: 3 | Status: SHIPPED | OUTPATIENT
Start: 2024-01-23 | End: 2025-01-22

## 2024-01-23 ASSESSMENT — ENCOUNTER SYMPTOMS
DIZZINESS: 0
ORTHOPNEA: 0
DEPRESSION: 0
FEVER: 0
SHORTNESS OF BREATH: 0
LOSS OF SENSATION IN FEET: 0
OCCASIONAL FEELINGS OF UNSTEADINESS: 0
WEIGHT LOSS: 0
WEIGHT GAIN: 0
NEAR-SYNCOPE: 0
PALPITATIONS: 0
WHEEZING: 0
CLAUDICATION: 0
IRREGULAR HEARTBEAT: 0
PND: 0
WEAKNESS: 0
DYSPNEA ON EXERTION: 0
MYALGIAS: 0
COUGH: 0
SYNCOPE: 0
DIAPHORESIS: 0

## 2024-01-23 ASSESSMENT — PATIENT HEALTH QUESTIONNAIRE - PHQ9
1. LITTLE INTEREST OR PLEASURE IN DOING THINGS: NOT AT ALL
2. FEELING DOWN, DEPRESSED OR HOPELESS: NOT AT ALL
SUM OF ALL RESPONSES TO PHQ9 QUESTIONS 1 AND 2: 0

## 2024-01-23 ASSESSMENT — PAIN SCALES - GENERAL: PAINLEVEL: 3

## 2024-01-23 NOTE — PROGRESS NOTES
Subjective      Chief Complaint   Patient presents with    Follow-up     BP CHECK        68-year-old female with history of hypertension and diabetes mellitus type 2 whom I saw in October for preoperative restratification for bariatric surgery.  We ultimately deemed her to be of low/acceptable risk.  She was hypertensive we added Norvasc.  She is here for reassessment.         Review of Systems   Constitutional: Negative for diaphoresis, fever, weight gain and weight loss.   Eyes:  Negative for visual disturbance.   Cardiovascular:  Negative for chest pain, claudication, dyspnea on exertion, irregular heartbeat, leg swelling, near-syncope, orthopnea, palpitations, paroxysmal nocturnal dyspnea and syncope.   Respiratory:  Negative for cough, shortness of breath and wheezing.    Musculoskeletal:  Negative for muscle weakness and myalgias.   Neurological:  Negative for dizziness and weakness.   All other systems reviewed and are negative.       Past Medical History:   Diagnosis Date    Hyperlipidemia     Hypertension     Sleep apnea         History reviewed. No pertinent surgical history.     Social History     Socioeconomic History    Marital status:      Spouse name: Not on file    Number of children: Not on file    Years of education: Not on file    Highest education level: Not on file   Occupational History    Not on file   Tobacco Use    Smoking status: Never     Passive exposure: Never    Smokeless tobacco: Never   Vaping Use    Vaping Use: Never used   Substance and Sexual Activity    Alcohol use: Never    Drug use: Never    Sexual activity: Defer   Other Topics Concern    Not on file   Social History Narrative    Not on file     Social Determinants of Health     Financial Resource Strain: Not on file   Food Insecurity: Not on file   Transportation Needs: Not on file   Physical Activity: Not on file   Stress: Not on file   Social Connections: Not on file   Intimate Partner Violence: Not on file   Housing  Stability: Not on file        Family History   Problem Relation Name Age of Onset    Obesity Mother          morbid    Diabetes Mother      Hypertension Mother      Lung cancer Mother      Hypertension Father      Melanoma Father      Stroke Father      Heart attack Father          OBJECTIVE:    Vitals:    01/23/24 1348   BP: 142/70   Pulse: 88   SpO2: 96%        Vitals reviewed.   Constitutional:       Appearance: Normal and healthy appearance. Not in distress.   Pulmonary:      Effort: Pulmonary effort is normal.      Breath sounds: Normal breath sounds.   Cardiovascular:      Normal rate. Regular rhythm. Normal S1. Normal S2.       Murmurs: There is no murmur.      No gallop.  No click.   Pulses:     Intact distal pulses.   Edema:     Peripheral edema absent.   Skin:     General: Skin is warm and dry.   Neurological:      General: No focal deficit present.          Lab Review:   Lab Results   Component Value Date     10/25/2023    K 4.0 10/25/2023    CL 98 10/25/2023    CO2 24 10/25/2023    BUN 14 10/25/2023    CREATININE 0.80 10/25/2023    GLUCOSE 198 (H) 10/25/2023    CALCIUM 9.4 10/25/2023     Lab Results   Component Value Date    CHOL 137 10/25/2023    TRIG 177 (H) 10/25/2023    HDL 56.0 10/25/2023       Lab Results   Component Value Date    LDLCALC 46 (L) 10/25/2023        Essential hypertension  Uncontrolled. Increasing norvasc to BID.     Other hyperlipidemia  Lipids from October were excellent. Continue statin

## 2024-02-13 ENCOUNTER — LAB (OUTPATIENT)
Dept: LAB | Facility: LAB | Age: 69
End: 2024-02-13
Payer: COMMERCIAL

## 2024-02-13 ENCOUNTER — NUTRITION (OUTPATIENT)
Dept: SURGERY | Facility: CLINIC | Age: 69
End: 2024-02-13
Payer: COMMERCIAL

## 2024-02-13 ENCOUNTER — TELEPHONE (OUTPATIENT)
Dept: SURGERY | Facility: CLINIC | Age: 69
End: 2024-02-13

## 2024-02-13 ENCOUNTER — OFFICE VISIT (OUTPATIENT)
Dept: SURGERY | Facility: CLINIC | Age: 69
End: 2024-02-13
Payer: COMMERCIAL

## 2024-02-13 VITALS
RESPIRATION RATE: 16 BRPM | HEART RATE: 100 BPM | HEIGHT: 58 IN | SYSTOLIC BLOOD PRESSURE: 152 MMHG | WEIGHT: 293 LBS | DIASTOLIC BLOOD PRESSURE: 73 MMHG | BODY MASS INDEX: 61.5 KG/M2

## 2024-02-13 DIAGNOSIS — E66.01 MORBID OBESITY DUE TO EXCESS CALORIES (MULTI): ICD-10-CM

## 2024-02-13 DIAGNOSIS — E11.9 TYPE 2 DIABETES MELLITUS WITHOUT COMPLICATION, WITHOUT LONG-TERM CURRENT USE OF INSULIN (MULTI): ICD-10-CM

## 2024-02-13 DIAGNOSIS — G47.33 OBSTRUCTIVE SLEEP APNEA: ICD-10-CM

## 2024-02-13 DIAGNOSIS — E63.9 NUTRITIONAL DEFICIENCY: ICD-10-CM

## 2024-02-13 DIAGNOSIS — I10 PRIMARY HYPERTENSION: Primary | ICD-10-CM

## 2024-02-13 LAB
EST. AVERAGE GLUCOSE BLD GHB EST-MCNC: 151 MG/DL
HBA1C MFR BLD: 6.9 %

## 2024-02-13 PROCEDURE — 1036F TOBACCO NON-USER: CPT | Performed by: INTERNAL MEDICINE

## 2024-02-13 PROCEDURE — 4010F ACE/ARB THERAPY RXD/TAKEN: CPT | Performed by: INTERNAL MEDICINE

## 2024-02-13 PROCEDURE — 1159F MED LIST DOCD IN RCRD: CPT | Performed by: INTERNAL MEDICINE

## 2024-02-13 PROCEDURE — 99213 OFFICE O/P EST LOW 20 MIN: CPT | Performed by: INTERNAL MEDICINE

## 2024-02-13 PROCEDURE — 83036 HEMOGLOBIN GLYCOSYLATED A1C: CPT

## 2024-02-13 PROCEDURE — 1125F AMNT PAIN NOTED PAIN PRSNT: CPT | Performed by: INTERNAL MEDICINE

## 2024-02-13 PROCEDURE — 36415 COLL VENOUS BLD VENIPUNCTURE: CPT

## 2024-02-13 PROCEDURE — 3077F SYST BP >= 140 MM HG: CPT | Performed by: INTERNAL MEDICINE

## 2024-02-13 PROCEDURE — 3044F HG A1C LEVEL LT 7.0%: CPT | Performed by: INTERNAL MEDICINE

## 2024-02-13 PROCEDURE — 3078F DIAST BP <80 MM HG: CPT | Performed by: INTERNAL MEDICINE

## 2024-02-13 PROCEDURE — 84590 ASSAY OF VITAMIN A: CPT

## 2024-02-13 ASSESSMENT — ENCOUNTER SYMPTOMS
PALPITATIONS: 0
NAUSEA: 0
OCCASIONAL FEELINGS OF UNSTEADINESS: 0
LOSS OF SENSATION IN FEET: 0
DEPRESSION: 0
DIARRHEA: 0
DIZZINESS: 0
ABDOMINAL PAIN: 0
ARTHRALGIAS: 0
COUGH: 0
CONSTIPATION: 0
SHORTNESS OF BREATH: 0

## 2024-02-13 ASSESSMENT — PAIN SCALES - GENERAL: PAINLEVEL: 4

## 2024-02-13 ASSESSMENT — PATIENT HEALTH QUESTIONNAIRE - PHQ9
SUM OF ALL RESPONSES TO PHQ9 QUESTIONS 1 AND 2: 0
1. LITTLE INTEREST OR PLEASURE IN DOING THINGS: NOT AT ALL
2. FEELING DOWN, DEPRESSED OR HOPELESS: NOT AT ALL

## 2024-02-13 NOTE — PROGRESS NOTES
Medical Weight Loss Appointment ([facility])    Current Weight: 301 lbs   Current BMI: 62.37   Weight loss goal per Dr. Barry: 300lb     Current Diet: lifelong rules   Adherence: good   Daily Intake:   Breakfast (9-10am)  2 eggs   Lunch (1-2pm)  Greek yogurt + string cheese   Dinner (6-7pm) Chicken or pork chop with vegetables   Snacks: minimal, no snacking this past month   Beverages: Water, 2 cups of coffee with creamer   Exercise: Chair exercises everyday or twice per day   Behavior/Diet Changes: Nisa is having 3 meals daily and is not snacking. She reports that she is watching her portions and using a smaller plate. She is drinking mainly water. She is also using her chair exercises daily. Nisa is feeling confident and ready for surgery.     Estimated ability to achieve goals: good     Today's Lesson: Reviewed patient's dietary changes and food recall  Diet Goals: Practice the lifelong rules  Exercise Recommendations: Gradually work up to 150 minutes of moderate intensity exercise per week.  Goals for the month:   - continue to practice the lifelong rules   - maintain weight loss. Further 1-2# weight loss per week desirable.     Plan: dietary clearance was provided today.     Orquidea Santamaria, MS, RD, LD

## 2024-02-13 NOTE — PROGRESS NOTES
"Subjective   Patient ID: Nisa Guzman is a 68 y.o. female who presents for [facility] visit 7. Currently has 3 meals a day, at least 20g protein with each meal. For breakfast, she has 2 scrambled eggs. Lunch consists of yogurt and string cheese. For dinner, she cooks pork chops, chicken. No longer snacking on popcorn. Drinks only water and coffee. Regarding exercise, she completes chair exercises and rides her bike when weather permits. Will receive CPAP Monday.    Diagnostics Reviewed: 10/2023 EKG NSR. 10/2023 sleep study showed mild sleep apnea.   Labs Reviewed: 2/2024 A1c 6.9. 10/2023 labs WNL. 10/2022 A1c 7.3.          Review of Systems   Respiratory:  Negative for cough and shortness of breath.    Cardiovascular:  Negative for chest pain and palpitations.   Gastrointestinal:  Negative for abdominal pain, constipation, diarrhea and nausea.   Musculoskeletal:  Negative for arthralgias.   Neurological:  Negative for dizziness.       Objective   /73   Pulse 100   Resp 16   Ht 1.48 m (4' 10.25\")   Wt 137 kg (301 lb)   BMI 62.37 kg/m²     Physical Exam  Constitutional:       Appearance: She is obese.   HENT:      Mouth/Throat:      Comments: Dentition ok  Cardiovascular:      Rate and Rhythm: Normal rate and regular rhythm.   Pulmonary:      Breath sounds: Normal breath sounds.   Abdominal:      General: Bowel sounds are normal.      Palpations: Abdomen is soft.   Musculoskeletal:         General: No swelling.   Neurological:      Mental Status: She is alert.         Assessment/Plan   Diagnoses and all orders for this visit:  Primary hypertension  Obstructive sleep apnea  Type 2 diabetes mellitus without complication, without long-term current use of insulin (CMS/[facility])  Morbid obesity due to excess calories (CMS/[facility])        Scribe Attestation  By signing my name below, IElaine, Scribe   attest that this documentation has been prepared under the direction and in the presence of Haven Alejandro MD.  "

## 2024-02-13 NOTE — TELEPHONE ENCOUNTER
SPOKE WITH PATIENT REORDERED APAP MACHINE DUE TO ORIGINAL ORDER WAS CANCELLED BY THE COMPANY (NO NOTIFICATION WAS GIVEN TO THE OFFICE AT THE TIME OF THE CANCELLATION/ SPOKE WITH THE COMPANY THEY SAID IT IS NOT THEIR POLICY TO NOTIFY THE OFFICE/ ONLY NOTIFICATION IS PROVIDED TO THE PATIENT).  WHEN SPEAKING WITH THE PATIENT THEY STATED THAT THEY WERE NOT NOTIFIED.  THE ORDER WAS REFAXED PATIENT HAS APPT ON 2/13/ 2024 IN THE OFFICE PATIENT TO BE NOTIFIED THAT THE REORDER WENT THROUGH.  [employer] (THE PREFERRED COMPANY FROM THE INSURANCE (DEVOTED)) STATES THAT THE APAP ORDER SHOULD TAKE UP TO 7 DAYS.

## 2024-02-13 NOTE — PROGRESS NOTES
Medical Weight Loss Appointment ([facility])    Current Weight:   Current BMI:   Weight loss goal per Dr. Barry: 300lb     Current Diet:  Adherence:  Daily Intake:   Breakfast-  Lunch-  Dinner-  Snacks:  Beverages:  Exercise:  Behavior/Diet Changes:    Estimated ability to achieve goals:    Today's Lesson: Reviewed patient's dietary changes and food recall  Diet Goals: Practice the lifelong rules  Exercise Recommendations: Gradually work up to 150 minutes of moderate intensity exercise per week.  Behavior Goals:

## 2024-02-16 LAB
ANNOTATION COMMENT IMP: NORMAL
RETINYL PALMITATE SERPL-MCNC: <0.02 MG/L (ref 0–0.1)
VIT A SERPL-MCNC: 0.47 MG/L (ref 0.3–1.2)

## 2024-03-06 DIAGNOSIS — E66.01 OBESITY, MORBID (MULTI): ICD-10-CM

## 2024-03-07 ENCOUNTER — TELEPHONE (OUTPATIENT)
Dept: SURGERY | Facility: CLINIC | Age: 69
End: 2024-03-07
Payer: COMMERCIAL

## 2024-03-08 NOTE — PROGRESS NOTES
Pre-Operative Dietary Education     Current Weight: 298 lb  Current BMI: 61.87    In class settings, reviewed thin liquids diet that patient will be required to follow for 2 weeks after surgery. Reviewed low calorie fluids along with protein shakes and products that can be consumed. Emphasized the importance of following diet guidelines and recommendations after surgery to prevent complications. Addressed liquids and items to avoid at this time. Patients are educated to drink at least 50 oz of fluid per day, and gradually take in 50g protein per day. Briefly reviewed the diet progression for the next 3-4 months, which will also be discussed at each follow up appointment after surgery. Also reviewed supplementation after bariatric surgery. Patient was instructed to take chewable MVI with iron once daily for the first 6 weeks after surgery. Other supplementation will be introduced at 6 week follow up appointment.       Keiry Herndon RD, LDN  Registered Dietitian, Licensed Dietitian Nutritionist

## 2024-03-12 ENCOUNTER — OFFICE VISIT (OUTPATIENT)
Dept: SURGERY | Facility: CLINIC | Age: 69
End: 2024-03-12
Payer: COMMERCIAL

## 2024-03-12 ENCOUNTER — CLINICAL SUPPORT (OUTPATIENT)
Dept: SURGERY | Facility: CLINIC | Age: 69
End: 2024-03-12
Payer: COMMERCIAL

## 2024-03-12 VITALS
HEIGHT: 58 IN | HEART RATE: 92 BPM | BODY MASS INDEX: 61.5 KG/M2 | SYSTOLIC BLOOD PRESSURE: 140 MMHG | WEIGHT: 293 LBS | RESPIRATION RATE: 16 BRPM | DIASTOLIC BLOOD PRESSURE: 75 MMHG

## 2024-03-12 DIAGNOSIS — G47.30 SLEEP APNEA, UNSPECIFIED TYPE: ICD-10-CM

## 2024-03-12 DIAGNOSIS — Z01.818 PREOP EXAMINATION: ICD-10-CM

## 2024-03-12 DIAGNOSIS — I10 PRIMARY HYPERTENSION: ICD-10-CM

## 2024-03-12 PROCEDURE — 3077F SYST BP >= 140 MM HG: CPT | Performed by: INTERNAL MEDICINE

## 2024-03-12 PROCEDURE — 99213 OFFICE O/P EST LOW 20 MIN: CPT | Performed by: INTERNAL MEDICINE

## 2024-03-12 PROCEDURE — 3078F DIAST BP <80 MM HG: CPT | Performed by: INTERNAL MEDICINE

## 2024-03-12 PROCEDURE — 1159F MED LIST DOCD IN RCRD: CPT | Performed by: INTERNAL MEDICINE

## 2024-03-12 PROCEDURE — 1125F AMNT PAIN NOTED PAIN PRSNT: CPT | Performed by: INTERNAL MEDICINE

## 2024-03-12 PROCEDURE — 1036F TOBACCO NON-USER: CPT | Performed by: INTERNAL MEDICINE

## 2024-03-12 ASSESSMENT — ENCOUNTER SYMPTOMS
COUGH: 0
ARTHRALGIAS: 0
CONSTIPATION: 0
ABDOMINAL PAIN: 0
NAUSEA: 0
DIARRHEA: 0
LOSS OF SENSATION IN FEET: 0
DEPRESSION: 0
DIZZINESS: 0
SHORTNESS OF BREATH: 0
PALPITATIONS: 0
OCCASIONAL FEELINGS OF UNSTEADINESS: 0

## 2024-03-12 ASSESSMENT — PAIN SCALES - GENERAL: PAINLEVEL: 7

## 2024-03-12 NOTE — PATIENT INSTRUCTIONS
Hold for PreserVision two weeks before surgey. Day before surgery take medications as usual except no Metformin. Morning of surgery hold all medications. Counseled on low calorie diet and regular exercise.  He is medically clear for surgery pending lab results and chest x-ray result

## 2024-03-12 NOTE — PROGRESS NOTES
"Subjective   Patient ID: Nisa Guzman is a 68 y.o. female who presents preoperative clearance for gastric bypass. Currently has 3 meals a day, at least 20g protein with each meal. She does not snack at all. Drinks only water and coffee, no sugary beverages. Regarding exercise, she completes chair exercises and rides her bike when weather permits. She uses CPAP and trying to tolerate it. Received cardiac clearance from Dr. Hernandes. Denies hx of blood clot.    Diagnostics Reviewed: 10/2023 EKG NSR. 10/2023 sleep study showed mild sleep apnea.   Labs Reviewed: 2024 A1c 6.9. 10/2023 labs WNL. 10/2022 A1c 7.3.          Review of Systems   Respiratory:  Negative for cough and shortness of breath.    Cardiovascular:  Negative for chest pain and palpitations.   Gastrointestinal:  Negative for abdominal pain, constipation, diarrhea and nausea.   Musculoskeletal:  Negative for arthralgias.   Neurological:  Negative for dizziness.       Objective   /75   Pulse 92   Resp 16   Ht 1.48 m (4' 10.25\")   Wt 135 kg (298 lb 9.6 oz)   BMI 61.87 kg/m²     Physical Exam  Constitutional:       Appearance: She is obese.   HENT:      Mouth/Throat:      Comments: Dentition ok  Cardiovascular:      Rate and Rhythm: Normal rate and regular rhythm.   Pulmonary:      Breath sounds: Normal breath sounds.   Abdominal:      General: Bowel sounds are normal.      Palpations: Abdomen is soft.   Musculoskeletal:         General: No swelling.      Right lower le+ Edema present.      Left lower le+ Edema present.   Neurological:      Mental Status: She is alert.         Assessment/Plan   Diagnoses and all orders for this visit:  Preop examination  -     Comprehensive Metabolic Panel; Future  -     CBC and Auto Differential; Future  -     XR chest 2 views; Future  Primary hypertension  -     XR chest 2 views; Future  Sleep apnea, unspecified type  -     Comprehensive Metabolic Panel; Future  -     CBC and Auto Differential; " Future        Scribe Attestation  By signing my name below, I, Elaine Gamble, Scrpaul   attest that this documentation has been prepared under the direction and in the presence of Haven Alejandro MD.

## 2024-03-16 ENCOUNTER — HOSPITAL ENCOUNTER (OUTPATIENT)
Dept: RADIOLOGY | Facility: HOSPITAL | Age: 69
Discharge: HOME | End: 2024-03-16
Payer: COMMERCIAL

## 2024-03-16 DIAGNOSIS — I10 PRIMARY HYPERTENSION: ICD-10-CM

## 2024-03-16 DIAGNOSIS — Z01.818 PREOP EXAMINATION: ICD-10-CM

## 2024-03-16 PROCEDURE — 71046 X-RAY EXAM CHEST 2 VIEWS: CPT

## 2024-03-19 ENCOUNTER — LAB (OUTPATIENT)
Dept: LAB | Facility: LAB | Age: 69
End: 2024-03-19
Payer: COMMERCIAL

## 2024-03-19 DIAGNOSIS — Z01.818 PREOP EXAMINATION: ICD-10-CM

## 2024-03-19 DIAGNOSIS — G47.30 SLEEP APNEA, UNSPECIFIED TYPE: ICD-10-CM

## 2024-03-19 LAB
ALBUMIN SERPL-MCNC: 4.1 G/DL (ref 3.5–5)
ALP BLD-CCNC: 117 U/L (ref 35–125)
ALT SERPL-CCNC: 13 U/L (ref 5–40)
ANION GAP SERPL CALC-SCNC: 18 MMOL/L
AST SERPL-CCNC: 17 U/L (ref 5–40)
BASOPHILS # BLD AUTO: 0.08 X10*3/UL (ref 0–0.1)
BASOPHILS NFR BLD AUTO: 0.7 %
BILIRUB SERPL-MCNC: 0.5 MG/DL (ref 0.1–1.2)
BUN SERPL-MCNC: 12 MG/DL (ref 8–25)
CALCIUM SERPL-MCNC: 9.3 MG/DL (ref 8.5–10.4)
CHLORIDE SERPL-SCNC: 97 MMOL/L (ref 97–107)
CO2 SERPL-SCNC: 21 MMOL/L (ref 24–31)
CREAT SERPL-MCNC: 0.8 MG/DL (ref 0.4–1.6)
EGFRCR SERPLBLD CKD-EPI 2021: 81 ML/MIN/1.73M*2
EOSINOPHIL # BLD AUTO: 0.27 X10*3/UL (ref 0–0.7)
EOSINOPHIL NFR BLD AUTO: 2.5 %
ERYTHROCYTE [DISTWIDTH] IN BLOOD BY AUTOMATED COUNT: 14.3 % (ref 11.5–14.5)
GLUCOSE SERPL-MCNC: 137 MG/DL (ref 65–99)
HCT VFR BLD AUTO: 43.8 % (ref 36–46)
HGB BLD-MCNC: 13.8 G/DL (ref 12–16)
IMM GRANULOCYTES # BLD AUTO: 0.06 X10*3/UL (ref 0–0.7)
IMM GRANULOCYTES NFR BLD AUTO: 0.6 % (ref 0–0.9)
LYMPHOCYTES # BLD AUTO: 1.88 X10*3/UL (ref 1.2–4.8)
LYMPHOCYTES NFR BLD AUTO: 17.3 %
MCH RBC QN AUTO: 29.1 PG (ref 26–34)
MCHC RBC AUTO-ENTMCNC: 31.5 G/DL (ref 32–36)
MCV RBC AUTO: 92 FL (ref 80–100)
MONOCYTES # BLD AUTO: 0.62 X10*3/UL (ref 0.1–1)
MONOCYTES NFR BLD AUTO: 5.7 %
NEUTROPHILS # BLD AUTO: 7.96 X10*3/UL (ref 1.2–7.7)
NEUTROPHILS NFR BLD AUTO: 73.2 %
NRBC BLD-RTO: 0 /100 WBCS (ref 0–0)
PLATELET # BLD AUTO: 281 X10*3/UL (ref 150–450)
POTASSIUM SERPL-SCNC: 4.3 MMOL/L (ref 3.4–5.1)
PROT SERPL-MCNC: 7.2 G/DL (ref 5.9–7.9)
RBC # BLD AUTO: 4.74 X10*6/UL (ref 4–5.2)
SODIUM SERPL-SCNC: 136 MMOL/L (ref 133–145)
WBC # BLD AUTO: 10.9 X10*3/UL (ref 4.4–11.3)

## 2024-03-19 PROCEDURE — 85025 COMPLETE CBC W/AUTO DIFF WBC: CPT

## 2024-03-19 PROCEDURE — 80053 COMPREHEN METABOLIC PANEL: CPT

## 2024-03-19 PROCEDURE — 36415 COLL VENOUS BLD VENIPUNCTURE: CPT

## 2024-03-20 ENCOUNTER — ANESTHESIA (OUTPATIENT)
Dept: OPERATING ROOM | Facility: HOSPITAL | Age: 69
DRG: 620 | End: 2024-03-20
Payer: COMMERCIAL

## 2024-03-20 ENCOUNTER — HOSPITAL ENCOUNTER (INPATIENT)
Facility: HOSPITAL | Age: 69
LOS: 1 days | Discharge: HOME | DRG: 620 | End: 2024-03-21
Attending: SURGERY | Admitting: SURGERY
Payer: COMMERCIAL

## 2024-03-20 ENCOUNTER — ANESTHESIA EVENT (OUTPATIENT)
Dept: OPERATING ROOM | Facility: HOSPITAL | Age: 69
DRG: 620 | End: 2024-03-20
Payer: COMMERCIAL

## 2024-03-20 DIAGNOSIS — E11.9 TYPE 2 DIABETES MELLITUS TREATED WITHOUT INSULIN (MULTI): ICD-10-CM

## 2024-03-20 DIAGNOSIS — E88.810 METABOLIC SYNDROME: ICD-10-CM

## 2024-03-20 DIAGNOSIS — K66.0 ABDOMINAL ADHESIONS: ICD-10-CM

## 2024-03-20 DIAGNOSIS — F41.9 ANXIETY: ICD-10-CM

## 2024-03-20 DIAGNOSIS — E66.01 MORBID OBESITY WITH BMI OF 40.0-44.9, ADULT (MULTI): Primary | ICD-10-CM

## 2024-03-20 DIAGNOSIS — E78.00 HYPERCHOLESTEROLEMIA: ICD-10-CM

## 2024-03-20 DIAGNOSIS — I10 PRIMARY HYPERTENSION: ICD-10-CM

## 2024-03-20 DIAGNOSIS — E66.01 OBESITY, MORBID (MULTI): ICD-10-CM

## 2024-03-20 DIAGNOSIS — G47.33 OBSTRUCTIVE SLEEP APNEA: ICD-10-CM

## 2024-03-20 LAB
GLUCOSE BLD MANUAL STRIP-MCNC: 147 MG/DL (ref 74–99)
GLUCOSE BLD MANUAL STRIP-MCNC: 151 MG/DL (ref 74–99)
GLUCOSE BLD MANUAL STRIP-MCNC: 185 MG/DL (ref 74–99)
GLUCOSE BLD MANUAL STRIP-MCNC: 211 MG/DL (ref 74–99)
PLATELET # BLD AUTO: 264 X10*3/UL (ref 150–450)

## 2024-03-20 PROCEDURE — 1200000002 HC GENERAL ROOM WITH TELEMETRY DAILY

## 2024-03-20 PROCEDURE — 3600000009 HC OR TIME - EACH INCREMENTAL 1 MINUTE - PROCEDURE LEVEL FOUR: Performed by: SURGERY

## 2024-03-20 PROCEDURE — 82947 ASSAY GLUCOSE BLOOD QUANT: CPT

## 2024-03-20 PROCEDURE — 88307 TISSUE EXAM BY PATHOLOGIST: CPT | Mod: TC | Performed by: SURGERY

## 2024-03-20 PROCEDURE — 2720000007 HC OR 272 NO HCPCS: Performed by: SURGERY

## 2024-03-20 PROCEDURE — 2500000004 HC RX 250 GENERAL PHARMACY W/ HCPCS (ALT 636 FOR OP/ED): Performed by: SURGERY

## 2024-03-20 PROCEDURE — 2500000005 HC RX 250 GENERAL PHARMACY W/O HCPCS: Performed by: SURGERY

## 2024-03-20 PROCEDURE — A43775 PR LAP, GAST RESTRICT PROC, LONGITUDINAL GASTRECTOMY: Performed by: ANESTHESIOLOGY

## 2024-03-20 PROCEDURE — 0DN64ZZ RELEASE STOMACH, PERCUTANEOUS ENDOSCOPIC APPROACH: ICD-10-PCS | Performed by: SURGERY

## 2024-03-20 PROCEDURE — 2500000004 HC RX 250 GENERAL PHARMACY W/ HCPCS (ALT 636 FOR OP/ED)

## 2024-03-20 PROCEDURE — A43775 PR LAP, GAST RESTRICT PROC, LONGITUDINAL GASTRECTOMY: Performed by: ANESTHESIOLOGIST ASSISTANT

## 2024-03-20 PROCEDURE — 2780000003 HC OR 278 NO HCPCS: Performed by: SURGERY

## 2024-03-20 PROCEDURE — 0DB64Z3 EXCISION OF STOMACH, PERCUTANEOUS ENDOSCOPIC APPROACH, VERTICAL: ICD-10-PCS | Performed by: SURGERY

## 2024-03-20 PROCEDURE — 2500000004 HC RX 250 GENERAL PHARMACY W/ HCPCS (ALT 636 FOR OP/ED): Performed by: ANESTHESIOLOGIST ASSISTANT

## 2024-03-20 PROCEDURE — 3700000001 HC GENERAL ANESTHESIA TIME - INITIAL BASE CHARGE: Performed by: SURGERY

## 2024-03-20 PROCEDURE — 2500000002 HC RX 250 W HCPCS SELF ADMINISTERED DRUGS (ALT 637 FOR MEDICARE OP, ALT 636 FOR OP/ED): Performed by: ANESTHESIOLOGIST ASSISTANT

## 2024-03-20 PROCEDURE — 2500000004 HC RX 250 GENERAL PHARMACY W/ HCPCS (ALT 636 FOR OP/ED): Performed by: ANESTHESIOLOGY

## 2024-03-20 PROCEDURE — 99222 1ST HOSP IP/OBS MODERATE 55: CPT | Performed by: INTERNAL MEDICINE

## 2024-03-20 PROCEDURE — 36415 COLL VENOUS BLD VENIPUNCTURE: CPT

## 2024-03-20 PROCEDURE — 88307 TISSUE EXAM BY PATHOLOGIST: CPT | Performed by: PATHOLOGY

## 2024-03-20 PROCEDURE — 7100000002 HC RECOVERY ROOM TIME - EACH INCREMENTAL 1 MINUTE: Performed by: SURGERY

## 2024-03-20 PROCEDURE — 2500000005 HC RX 250 GENERAL PHARMACY W/O HCPCS: Performed by: INTERNAL MEDICINE

## 2024-03-20 PROCEDURE — 43775 LAP SLEEVE GASTRECTOMY: CPT | Performed by: SURGERY

## 2024-03-20 PROCEDURE — 9420000001 HC RT PATIENT EDUCATION 5 MIN

## 2024-03-20 PROCEDURE — 96372 THER/PROPH/DIAG INJ SC/IM: CPT

## 2024-03-20 PROCEDURE — 94660 CPAP INITIATION&MGMT: CPT

## 2024-03-20 PROCEDURE — 2500000005 HC RX 250 GENERAL PHARMACY W/O HCPCS: Performed by: ANESTHESIOLOGIST ASSISTANT

## 2024-03-20 PROCEDURE — C9113 INJ PANTOPRAZOLE SODIUM, VIA: HCPCS

## 2024-03-20 PROCEDURE — 3600000004 HC OR TIME - INITIAL BASE CHARGE - PROCEDURE LEVEL FOUR: Performed by: SURGERY

## 2024-03-20 PROCEDURE — 3700000002 HC GENERAL ANESTHESIA TIME - EACH INCREMENTAL 1 MINUTE: Performed by: SURGERY

## 2024-03-20 PROCEDURE — 7100000001 HC RECOVERY ROOM TIME - INITIAL BASE CHARGE: Performed by: SURGERY

## 2024-03-20 PROCEDURE — A4217 STERILE WATER/SALINE, 500 ML: HCPCS | Performed by: SURGERY

## 2024-03-20 PROCEDURE — 85049 AUTOMATED PLATELET COUNT: CPT

## 2024-03-20 RX ORDER — MIDAZOLAM HYDROCHLORIDE 1 MG/ML
INJECTION, SOLUTION INTRAMUSCULAR; INTRAVENOUS AS NEEDED
Status: DISCONTINUED | OUTPATIENT
Start: 2024-03-20 | End: 2024-03-20

## 2024-03-20 RX ORDER — LIDOCAINE HYDROCHLORIDE 10 MG/ML
INJECTION, SOLUTION EPIDURAL; INFILTRATION; INTRACAUDAL; PERINEURAL AS NEEDED
Status: DISCONTINUED | OUTPATIENT
Start: 2024-03-20 | End: 2024-03-20

## 2024-03-20 RX ORDER — BUPRENORPHINE HYDROCHLORIDE 0.32 MG/ML
0.1 INJECTION INTRAMUSCULAR; INTRAVENOUS EVERY 6 HOURS PRN
Status: DISCONTINUED | OUTPATIENT
Start: 2024-03-20 | End: 2024-03-21 | Stop reason: HOSPADM

## 2024-03-20 RX ORDER — CEFAZOLIN SODIUM IN 0.9 % NACL 3 G/100 ML
3 INTRAVENOUS SOLUTION, PIGGYBACK (ML) INTRAVENOUS ONCE
Status: COMPLETED | OUTPATIENT
Start: 2024-03-20 | End: 2024-03-20

## 2024-03-20 RX ORDER — NEOSTIGMINE METHYLSULFATE 1 MG/ML
INJECTION, SOLUTION INTRAVENOUS AS NEEDED
Status: DISCONTINUED | OUTPATIENT
Start: 2024-03-20 | End: 2024-03-20

## 2024-03-20 RX ORDER — ONDANSETRON HYDROCHLORIDE 2 MG/ML
4 INJECTION, SOLUTION INTRAVENOUS ONCE AS NEEDED
Status: DISCONTINUED | OUTPATIENT
Start: 2024-03-20 | End: 2024-03-20 | Stop reason: HOSPADM

## 2024-03-20 RX ORDER — ACETAMINOPHEN 10 MG/ML
1000 INJECTION, SOLUTION INTRAVENOUS ONCE
Status: COMPLETED | OUTPATIENT
Start: 2024-03-20 | End: 2024-03-20

## 2024-03-20 RX ORDER — GLYCOPYRROLATE 0.2 MG/ML
INJECTION INTRAMUSCULAR; INTRAVENOUS AS NEEDED
Status: DISCONTINUED | OUTPATIENT
Start: 2024-03-20 | End: 2024-03-20

## 2024-03-20 RX ORDER — ROCURONIUM BROMIDE 10 MG/ML
INJECTION, SOLUTION INTRAVENOUS AS NEEDED
Status: DISCONTINUED | OUTPATIENT
Start: 2024-03-20 | End: 2024-03-20

## 2024-03-20 RX ORDER — NALOXONE HYDROCHLORIDE 0.4 MG/ML
0.2 INJECTION, SOLUTION INTRAMUSCULAR; INTRAVENOUS; SUBCUTANEOUS EVERY 5 MIN PRN
Status: DISCONTINUED | OUTPATIENT
Start: 2024-03-20 | End: 2024-03-21 | Stop reason: HOSPADM

## 2024-03-20 RX ORDER — FENTANYL CITRATE 50 UG/ML
INJECTION, SOLUTION INTRAMUSCULAR; INTRAVENOUS AS NEEDED
Status: DISCONTINUED | OUTPATIENT
Start: 2024-03-20 | End: 2024-03-20

## 2024-03-20 RX ORDER — AMLODIPINE BESYLATE 5 MG/1
5 TABLET ORAL 2 TIMES DAILY
Status: DISCONTINUED | OUTPATIENT
Start: 2024-03-21 | End: 2024-03-21 | Stop reason: HOSPADM

## 2024-03-20 RX ORDER — METOPROLOL TARTRATE 1 MG/ML
5 INJECTION, SOLUTION INTRAVENOUS EVERY 6 HOURS
Status: DISCONTINUED | OUTPATIENT
Start: 2024-03-20 | End: 2024-03-21 | Stop reason: HOSPADM

## 2024-03-20 RX ORDER — PANTOPRAZOLE SODIUM 40 MG/10ML
40 INJECTION, POWDER, LYOPHILIZED, FOR SOLUTION INTRAVENOUS ONCE
Status: COMPLETED | OUTPATIENT
Start: 2024-03-20 | End: 2024-03-20

## 2024-03-20 RX ORDER — ACETAMINOPHEN 10 MG/ML
1000 INJECTION, SOLUTION INTRAVENOUS EVERY 6 HOURS SCHEDULED
Status: DISCONTINUED | OUTPATIENT
Start: 2024-03-20 | End: 2024-03-20

## 2024-03-20 RX ORDER — PROCHLORPERAZINE 25 MG/1
25 SUPPOSITORY RECTAL EVERY 12 HOURS PRN
Status: DISCONTINUED | OUTPATIENT
Start: 2024-03-20 | End: 2024-03-21 | Stop reason: HOSPADM

## 2024-03-20 RX ORDER — FENTANYL CITRATE 50 UG/ML
25 INJECTION, SOLUTION INTRAMUSCULAR; INTRAVENOUS EVERY 5 MIN PRN
Status: DISCONTINUED | OUTPATIENT
Start: 2024-03-20 | End: 2024-03-20 | Stop reason: HOSPADM

## 2024-03-20 RX ORDER — OXYCODONE HYDROCHLORIDE 5 MG/1
5 TABLET ORAL EVERY 4 HOURS PRN
Status: DISCONTINUED | OUTPATIENT
Start: 2024-03-20 | End: 2024-03-20 | Stop reason: HOSPADM

## 2024-03-20 RX ORDER — FLUOXETINE 20 MG/5ML
20 SOLUTION ORAL DAILY
Status: DISCONTINUED | OUTPATIENT
Start: 2024-03-21 | End: 2024-03-21 | Stop reason: HOSPADM

## 2024-03-20 RX ORDER — LABETALOL HYDROCHLORIDE 5 MG/ML
5 INJECTION, SOLUTION INTRAVENOUS ONCE AS NEEDED
Status: DISCONTINUED | OUTPATIENT
Start: 2024-03-20 | End: 2024-03-20 | Stop reason: HOSPADM

## 2024-03-20 RX ORDER — SCOLOPAMINE TRANSDERMAL SYSTEM 1 MG/1
1 PATCH, EXTENDED RELEASE TRANSDERMAL ONCE
Status: DISCONTINUED | OUTPATIENT
Start: 2024-03-20 | End: 2024-03-20

## 2024-03-20 RX ORDER — PANTOPRAZOLE SODIUM 40 MG/10ML
40 INJECTION, POWDER, LYOPHILIZED, FOR SOLUTION INTRAVENOUS
Status: DISCONTINUED | OUTPATIENT
Start: 2024-03-21 | End: 2024-03-21 | Stop reason: HOSPADM

## 2024-03-20 RX ORDER — HEPARIN SODIUM 5000 [USP'U]/ML
5000 INJECTION, SOLUTION INTRAVENOUS; SUBCUTANEOUS ONCE
Status: COMPLETED | OUTPATIENT
Start: 2024-03-20 | End: 2024-03-20

## 2024-03-20 RX ORDER — HYDRALAZINE HYDROCHLORIDE 20 MG/ML
5 INJECTION INTRAMUSCULAR; INTRAVENOUS EVERY 4 HOURS PRN
Status: DISCONTINUED | OUTPATIENT
Start: 2024-03-20 | End: 2024-03-20 | Stop reason: SDUPTHER

## 2024-03-20 RX ORDER — FLUOXETINE 20 MG/5ML
20 SOLUTION ORAL DAILY
Qty: 150 ML | Refills: 1 | Status: SHIPPED | OUTPATIENT
Start: 2024-03-21 | End: 2024-05-20

## 2024-03-20 RX ORDER — PROCHLORPERAZINE MALEATE 10 MG
10 TABLET ORAL EVERY 6 HOURS PRN
Status: DISCONTINUED | OUTPATIENT
Start: 2024-03-20 | End: 2024-03-21 | Stop reason: HOSPADM

## 2024-03-20 RX ORDER — PROPOFOL 10 MG/ML
INJECTION, EMULSION INTRAVENOUS AS NEEDED
Status: DISCONTINUED | OUTPATIENT
Start: 2024-03-20 | End: 2024-03-20

## 2024-03-20 RX ORDER — PHENYLEPHRINE HCL IN 0.9% NACL 1 MG/10 ML
SYRINGE (ML) INTRAVENOUS AS NEEDED
Status: DISCONTINUED | OUTPATIENT
Start: 2024-03-20 | End: 2024-03-20

## 2024-03-20 RX ORDER — FENTANYL CITRATE 50 UG/ML
50 INJECTION, SOLUTION INTRAMUSCULAR; INTRAVENOUS EVERY 5 MIN PRN
Status: DISCONTINUED | OUTPATIENT
Start: 2024-03-20 | End: 2024-03-20 | Stop reason: HOSPADM

## 2024-03-20 RX ORDER — HYDRALAZINE HYDROCHLORIDE 20 MG/ML
5 INJECTION INTRAMUSCULAR; INTRAVENOUS EVERY 4 HOURS PRN
Status: DISCONTINUED | OUTPATIENT
Start: 2024-03-20 | End: 2024-03-21 | Stop reason: HOSPADM

## 2024-03-20 RX ORDER — DULOXETIN HYDROCHLORIDE 60 MG/1
60 CAPSULE, DELAYED RELEASE ORAL DAILY
Status: DISCONTINUED | OUTPATIENT
Start: 2024-03-21 | End: 2024-03-21 | Stop reason: HOSPADM

## 2024-03-20 RX ORDER — METOCLOPRAMIDE HYDROCHLORIDE 5 MG/ML
10 INJECTION INTRAMUSCULAR; INTRAVENOUS ONCE
Status: COMPLETED | OUTPATIENT
Start: 2024-03-20 | End: 2024-03-20

## 2024-03-20 RX ORDER — ATORVASTATIN CALCIUM 10 MG/1
10 TABLET, FILM COATED ORAL NIGHTLY
Status: DISCONTINUED | OUTPATIENT
Start: 2024-03-21 | End: 2024-03-21 | Stop reason: HOSPADM

## 2024-03-20 RX ORDER — SODIUM CHLORIDE, SODIUM LACTATE, POTASSIUM CHLORIDE, CALCIUM CHLORIDE 600; 310; 30; 20 MG/100ML; MG/100ML; MG/100ML; MG/100ML
100 INJECTION, SOLUTION INTRAVENOUS CONTINUOUS
Status: DISCONTINUED | OUTPATIENT
Start: 2024-03-20 | End: 2024-03-20

## 2024-03-20 RX ORDER — ONDANSETRON HYDROCHLORIDE 2 MG/ML
INJECTION, SOLUTION INTRAVENOUS AS NEEDED
Status: DISCONTINUED | OUTPATIENT
Start: 2024-03-20 | End: 2024-03-20

## 2024-03-20 RX ORDER — SODIUM CHLORIDE, SODIUM LACTATE, POTASSIUM CHLORIDE, CALCIUM CHLORIDE 600; 310; 30; 20 MG/100ML; MG/100ML; MG/100ML; MG/100ML
100 INJECTION, SOLUTION INTRAVENOUS CONTINUOUS
Status: DISCONTINUED | OUTPATIENT
Start: 2024-03-20 | End: 2024-03-20 | Stop reason: HOSPADM

## 2024-03-20 RX ORDER — ENOXAPARIN SODIUM 100 MG/ML
40 INJECTION SUBCUTANEOUS EVERY 12 HOURS SCHEDULED
Status: DISCONTINUED | OUTPATIENT
Start: 2024-03-20 | End: 2024-03-21 | Stop reason: HOSPADM

## 2024-03-20 RX ORDER — METOCLOPRAMIDE HYDROCHLORIDE 5 MG/ML
10 INJECTION INTRAMUSCULAR; INTRAVENOUS EVERY 6 HOURS PRN
Status: DISCONTINUED | OUTPATIENT
Start: 2024-03-20 | End: 2024-03-21 | Stop reason: HOSPADM

## 2024-03-20 RX ORDER — LOSARTAN POTASSIUM 100 MG/1
100 TABLET ORAL DAILY
Status: DISCONTINUED | OUTPATIENT
Start: 2024-03-21 | End: 2024-03-21 | Stop reason: HOSPADM

## 2024-03-20 RX ORDER — SODIUM CHLORIDE, SODIUM LACTATE, POTASSIUM CHLORIDE, CALCIUM CHLORIDE 600; 310; 30; 20 MG/100ML; MG/100ML; MG/100ML; MG/100ML
125 INJECTION, SOLUTION INTRAVENOUS CONTINUOUS
Status: DISCONTINUED | OUTPATIENT
Start: 2024-03-20 | End: 2024-03-21 | Stop reason: HOSPADM

## 2024-03-20 RX ORDER — LIDOCAINE HYDROCHLORIDE 10 MG/ML
0.1 INJECTION INFILTRATION; PERINEURAL ONCE
Status: DISCONTINUED | OUTPATIENT
Start: 2024-03-20 | End: 2024-03-20 | Stop reason: HOSPADM

## 2024-03-20 RX ORDER — MONTELUKAST SODIUM 10 MG/1
10 TABLET ORAL NIGHTLY
Status: DISCONTINUED | OUTPATIENT
Start: 2024-03-21 | End: 2024-03-21 | Stop reason: HOSPADM

## 2024-03-20 RX ORDER — ACETAMINOPHEN 10 MG/ML
1000 INJECTION, SOLUTION INTRAVENOUS EVERY 6 HOURS
Status: DISCONTINUED | OUTPATIENT
Start: 2024-03-20 | End: 2024-03-21 | Stop reason: HOSPADM

## 2024-03-20 RX ORDER — PROCHLORPERAZINE EDISYLATE 5 MG/ML
10 INJECTION INTRAMUSCULAR; INTRAVENOUS EVERY 6 HOURS PRN
Status: DISCONTINUED | OUTPATIENT
Start: 2024-03-20 | End: 2024-03-21 | Stop reason: HOSPADM

## 2024-03-20 RX ADMIN — FENTANYL CITRATE 25 MCG: 50 INJECTION, SOLUTION INTRAMUSCULAR; INTRAVENOUS at 09:28

## 2024-03-20 RX ADMIN — Medication 3 G: at 08:11

## 2024-03-20 RX ADMIN — INSULIN HUMAN 8 UNITS: 100 INJECTION, SOLUTION PARENTERAL at 10:03

## 2024-03-20 RX ADMIN — Medication 200 MCG: at 10:31

## 2024-03-20 RX ADMIN — SCOPALAMINE 1 PATCH: 1 PATCH, EXTENDED RELEASE TRANSDERMAL at 06:51

## 2024-03-20 RX ADMIN — DEXAMETHASONE SODIUM PHOSPHATE 4 MG: 4 INJECTION, SOLUTION INTRA-ARTICULAR; INTRALESIONAL; INTRAMUSCULAR; INTRAVENOUS; SOFT TISSUE at 08:25

## 2024-03-20 RX ADMIN — ROCURONIUM BROMIDE 10 MG: 10 INJECTION, SOLUTION INTRAVENOUS at 09:42

## 2024-03-20 RX ADMIN — GLYCOPYRROLATE 0.4 MG: 0.2 INJECTION INTRAMUSCULAR; INTRAVENOUS at 10:31

## 2024-03-20 RX ADMIN — ROCURONIUM BROMIDE 10 MG: 10 INJECTION, SOLUTION INTRAVENOUS at 09:30

## 2024-03-20 RX ADMIN — Medication 200 MCG: at 08:50

## 2024-03-20 RX ADMIN — SODIUM CHLORIDE, SODIUM LACTATE, POTASSIUM CHLORIDE, AND CALCIUM CHLORIDE: 600; 310; 30; 20 INJECTION, SOLUTION INTRAVENOUS at 09:40

## 2024-03-20 RX ADMIN — Medication 200 MCG: at 10:20

## 2024-03-20 RX ADMIN — SODIUM CHLORIDE, SODIUM LACTATE, POTASSIUM CHLORIDE, AND CALCIUM CHLORIDE 125 ML/HR: 600; 310; 30; 20 INJECTION, SOLUTION INTRAVENOUS at 22:49

## 2024-03-20 RX ADMIN — ROCURONIUM BROMIDE 20 MG: 10 INJECTION, SOLUTION INTRAVENOUS at 09:08

## 2024-03-20 RX ADMIN — GLYCOPYRROLATE 0.6 MG: 0.2 INJECTION INTRAMUSCULAR; INTRAVENOUS at 10:34

## 2024-03-20 RX ADMIN — Medication 200 MCG: at 10:17

## 2024-03-20 RX ADMIN — FENTANYL CITRATE 50 MCG: 50 INJECTION INTRAMUSCULAR; INTRAVENOUS at 11:43

## 2024-03-20 RX ADMIN — Medication 100 MCG: at 09:31

## 2024-03-20 RX ADMIN — ROCURONIUM BROMIDE 5 MG: 10 INJECTION, SOLUTION INTRAVENOUS at 10:10

## 2024-03-20 RX ADMIN — Medication 100 MCG: at 10:34

## 2024-03-20 RX ADMIN — Medication: at 13:45

## 2024-03-20 RX ADMIN — Medication 100 MCG: at 10:01

## 2024-03-20 RX ADMIN — Medication 100 MCG: at 10:28

## 2024-03-20 RX ADMIN — Medication 100 MCG: at 09:41

## 2024-03-20 RX ADMIN — NEOSTIGMINE METHYLSULFATE 2 MG: 1 INJECTION, SOLUTION INTRAVENOUS at 10:31

## 2024-03-20 RX ADMIN — Medication 2 L/MIN: at 13:45

## 2024-03-20 RX ADMIN — ENOXAPARIN SODIUM 40 MG: 40 INJECTION SUBCUTANEOUS at 21:24

## 2024-03-20 RX ADMIN — Medication 100 MCG: at 08:14

## 2024-03-20 RX ADMIN — BUPRENORPHINE HYDROCHLORIDE 0.1 MG: 0.32 INJECTION INTRAMUSCULAR; INTRAVENOUS at 14:03

## 2024-03-20 RX ADMIN — Medication 200 MCG: at 08:32

## 2024-03-20 RX ADMIN — LIDOCAINE HYDROCHLORIDE 5 ML: 10 INJECTION, SOLUTION EPIDURAL; INFILTRATION; INTRACAUDAL; PERINEURAL at 08:08

## 2024-03-20 RX ADMIN — HEPARIN SODIUM 5000 UNITS: 5000 INJECTION, SOLUTION INTRAVENOUS; SUBCUTANEOUS at 06:51

## 2024-03-20 RX ADMIN — METOPROLOL TARTRATE 5 MG: 5 INJECTION INTRAVENOUS at 22:43

## 2024-03-20 RX ADMIN — SODIUM CHLORIDE, SODIUM LACTATE, POTASSIUM CHLORIDE, AND CALCIUM CHLORIDE 125 ML/HR: 600; 310; 30; 20 INJECTION, SOLUTION INTRAVENOUS at 13:33

## 2024-03-20 RX ADMIN — MIDAZOLAM 2 MG: 1 INJECTION INTRAMUSCULAR; INTRAVENOUS at 08:08

## 2024-03-20 RX ADMIN — SUGAMMADEX 200 MG: 100 INJECTION, SOLUTION INTRAVENOUS at 10:44

## 2024-03-20 RX ADMIN — Medication 200 MCG: at 08:38

## 2024-03-20 RX ADMIN — METOCLOPRAMIDE 10 MG: 5 INJECTION, SOLUTION INTRAMUSCULAR; INTRAVENOUS at 07:08

## 2024-03-20 RX ADMIN — GLUCAGON HYDROCHLORIDE 1 MG: 1 INJECTION, POWDER, FOR SOLUTION INTRAMUSCULAR; INTRAVENOUS; SUBCUTANEOUS at 09:42

## 2024-03-20 RX ADMIN — SODIUM CHLORIDE, SODIUM LACTATE, POTASSIUM CHLORIDE, AND CALCIUM CHLORIDE 100 ML/HR: 600; 310; 30; 20 INJECTION, SOLUTION INTRAVENOUS at 07:08

## 2024-03-20 RX ADMIN — ACETAMINOPHEN 1000 MG: 10 INJECTION INTRAVENOUS at 07:08

## 2024-03-20 RX ADMIN — NEOSTIGMINE METHYLSULFATE 3 MG: 1 INJECTION, SOLUTION INTRAVENOUS at 10:34

## 2024-03-20 RX ADMIN — Medication 100 MCG: at 09:37

## 2024-03-20 RX ADMIN — Medication 200 MCG: at 08:44

## 2024-03-20 RX ADMIN — ROCURONIUM BROMIDE 20 MG: 10 INJECTION, SOLUTION INTRAVENOUS at 08:38

## 2024-03-20 RX ADMIN — Medication 100 MCG: at 10:05

## 2024-03-20 RX ADMIN — ONDANSETRON HYDROCHLORIDE 4 MG: 2 INJECTION INTRAMUSCULAR; INTRAVENOUS at 10:25

## 2024-03-20 RX ADMIN — PROPOFOL 200 MG: 10 INJECTION, EMULSION INTRAVENOUS at 08:10

## 2024-03-20 RX ADMIN — Medication 200 MCG: at 09:22

## 2024-03-20 RX ADMIN — ROCURONIUM BROMIDE 50 MG: 10 INJECTION, SOLUTION INTRAVENOUS at 08:10

## 2024-03-20 RX ADMIN — ACETAMINOPHEN 1000 MG: 10 INJECTION INTRAVENOUS at 19:43

## 2024-03-20 RX ADMIN — FENTANYL CITRATE 50 MCG: 50 INJECTION, SOLUTION INTRAMUSCULAR; INTRAVENOUS at 08:10

## 2024-03-20 RX ADMIN — Medication 200 MCG: at 08:28

## 2024-03-20 RX ADMIN — ACETAMINOPHEN 1000 MG: 10 INJECTION INTRAVENOUS at 13:33

## 2024-03-20 RX ADMIN — Medication 200 MCG: at 10:22

## 2024-03-20 RX ADMIN — FENTANYL CITRATE 25 MCG: 50 INJECTION, SOLUTION INTRAMUSCULAR; INTRAVENOUS at 09:10

## 2024-03-20 RX ADMIN — PANTOPRAZOLE SODIUM 40 MG: 40 INJECTION, POWDER, FOR SOLUTION INTRAVENOUS at 07:08

## 2024-03-20 RX ADMIN — Medication 200 MCG: at 09:47

## 2024-03-20 SDOH — SOCIAL STABILITY: SOCIAL INSECURITY
WITHIN THE LAST YEAR, HAVE TO BEEN RAPED OR FORCED TO HAVE ANY KIND OF SEXUAL ACTIVITY BY YOUR PARTNER OR EX-PARTNER?: NO

## 2024-03-20 SDOH — SOCIAL STABILITY: SOCIAL INSECURITY
WITHIN THE LAST YEAR, HAVE YOU BEEN KICKED, HIT, SLAPPED, OR OTHERWISE PHYSICALLY HURT BY YOUR PARTNER OR EX-PARTNER?: NO

## 2024-03-20 SDOH — ECONOMIC STABILITY: INCOME INSECURITY: IN THE PAST 12 MONTHS, HAS THE ELECTRIC, GAS, OIL, OR WATER COMPANY THREATENED TO SHUT OFF SERVICE IN YOUR HOME?: NO

## 2024-03-20 SDOH — HEALTH STABILITY: MENTAL HEALTH: EXPERIENCED ANY OF THE FOLLOWING LIFE EVENTS: DEATH OF FAMILY/FRIEND

## 2024-03-20 SDOH — SOCIAL STABILITY: SOCIAL INSECURITY: ARE THERE ANY APPARENT SIGNS OF INJURIES/BEHAVIORS THAT COULD BE RELATED TO ABUSE/NEGLECT?: NO

## 2024-03-20 SDOH — SOCIAL STABILITY: SOCIAL NETWORK: ARE YOU MARRIED, WIDOWED, DIVORCED, SEPARATED, NEVER MARRIED, OR LIVING WITH A PARTNER?: MARRIED

## 2024-03-20 SDOH — SOCIAL STABILITY: SOCIAL INSECURITY: ARE YOU OR HAVE YOU BEEN THREATENED OR ABUSED PHYSICALLY, EMOTIONALLY, OR SEXUALLY BY ANYONE?: NO

## 2024-03-20 SDOH — SOCIAL STABILITY: SOCIAL NETWORK
DO YOU BELONG TO ANY CLUBS OR ORGANIZATIONS SUCH AS CHURCH GROUPS UNIONS, FRATERNAL OR ATHLETIC GROUPS, OR SCHOOL GROUPS?: NO

## 2024-03-20 SDOH — ECONOMIC STABILITY: FOOD INSECURITY: WITHIN THE PAST 12 MONTHS, YOU WORRIED THAT YOUR FOOD WOULD RUN OUT BEFORE YOU GOT MONEY TO BUY MORE.: NEVER TRUE

## 2024-03-20 SDOH — SOCIAL STABILITY: SOCIAL INSECURITY: WITHIN THE LAST YEAR, HAVE YOU BEEN HUMILIATED OR EMOTIONALLY ABUSED IN OTHER WAYS BY YOUR PARTNER OR EX-PARTNER?: NO

## 2024-03-20 SDOH — SOCIAL STABILITY: SOCIAL INSECURITY: ABUSE: ADULT

## 2024-03-20 SDOH — SOCIAL STABILITY: SOCIAL INSECURITY: DO YOU FEEL ANYONE HAS EXPLOITED OR TAKEN ADVANTAGE OF YOU FINANCIALLY OR OF YOUR PERSONAL PROPERTY?: NO

## 2024-03-20 SDOH — SOCIAL STABILITY: SOCIAL NETWORK
IN A TYPICAL WEEK, HOW MANY TIMES DO YOU TALK ON THE PHONE WITH FAMILY, FRIENDS, OR NEIGHBORS?: MORE THAN THREE TIMES A WEEK

## 2024-03-20 SDOH — SOCIAL STABILITY: SOCIAL INSECURITY: WITHIN THE LAST YEAR, HAVE YOU BEEN AFRAID OF YOUR PARTNER OR EX-PARTNER?: NO

## 2024-03-20 SDOH — ECONOMIC STABILITY: FOOD INSECURITY: WITHIN THE PAST 12 MONTHS, THE FOOD YOU BOUGHT JUST DIDN'T LAST AND YOU DIDN'T HAVE MONEY TO GET MORE.: NEVER TRUE

## 2024-03-20 SDOH — SOCIAL STABILITY: SOCIAL NETWORK: HOW OFTEN DO YOU ATTEND CHURCH OR RELIGIOUS SERVICES?: NEVER

## 2024-03-20 SDOH — SOCIAL STABILITY: SOCIAL INSECURITY: HAVE YOU HAD THOUGHTS OF HARMING ANYONE ELSE?: NO

## 2024-03-20 SDOH — SOCIAL STABILITY: SOCIAL INSECURITY: HAS ANYONE EVER THREATENED TO HURT YOUR FAMILY OR YOUR PETS?: NO

## 2024-03-20 SDOH — SOCIAL STABILITY: SOCIAL INSECURITY: DOES ANYONE TRY TO KEEP YOU FROM HAVING/CONTACTING OTHER FRIENDS OR DOING THINGS OUTSIDE YOUR HOME?: NO

## 2024-03-20 SDOH — SOCIAL STABILITY: SOCIAL NETWORK: HOW OFTEN DO YOU ATTENT MEETINGS OF THE CLUB OR ORGANIZATION YOU BELONG TO?: NEVER

## 2024-03-20 SDOH — SOCIAL STABILITY: SOCIAL NETWORK: HOW OFTEN DO YOU GET TOGETHER WITH FRIENDS OR RELATIVES?: ONCE A WEEK

## 2024-03-20 SDOH — HEALTH STABILITY: MENTAL HEALTH
STRESS IS WHEN SOMEONE FEELS TENSE, NERVOUS, ANXIOUS, OR CAN'T SLEEP AT NIGHT BECAUSE THEIR MIND IS TROUBLED. HOW STRESSED ARE YOU?: NOT AT ALL

## 2024-03-20 SDOH — HEALTH STABILITY: PHYSICAL HEALTH: ON AVERAGE, HOW MANY MINUTES DO YOU ENGAGE IN EXERCISE AT THIS LEVEL?: 0 MIN

## 2024-03-20 SDOH — HEALTH STABILITY: PHYSICAL HEALTH: ON AVERAGE, HOW MANY DAYS PER WEEK DO YOU ENGAGE IN MODERATE TO STRENUOUS EXERCISE (LIKE A BRISK WALK)?: 0 DAYS

## 2024-03-20 SDOH — SOCIAL STABILITY: SOCIAL INSECURITY: WERE YOU ABLE TO COMPLETE ALL THE BEHAVIORAL HEALTH SCREENINGS?: YES

## 2024-03-20 SDOH — SOCIAL STABILITY: SOCIAL INSECURITY: DO YOU FEEL UNSAFE GOING BACK TO THE PLACE WHERE YOU ARE LIVING?: NO

## 2024-03-20 ASSESSMENT — COGNITIVE AND FUNCTIONAL STATUS - GENERAL
TOILETING: A LITTLE
DRESSING REGULAR UPPER BODY CLOTHING: A LITTLE
TURNING FROM BACK TO SIDE WHILE IN FLAT BAD: A LITTLE
CLIMB 3 TO 5 STEPS WITH RAILING: A LITTLE
MOVING FROM LYING ON BACK TO SITTING ON SIDE OF FLAT BED WITH BEDRAILS: A LITTLE
MOVING TO AND FROM BED TO CHAIR: A LITTLE
MOBILITY SCORE: 18
MOVING FROM LYING ON BACK TO SITTING ON SIDE OF FLAT BED WITH BEDRAILS: A LITTLE
DRESSING REGULAR LOWER BODY CLOTHING: A LITTLE
PERSONAL GROOMING: A LITTLE
MOBILITY SCORE: 17
DRESSING REGULAR LOWER BODY CLOTHING: A LITTLE
HELP NEEDED FOR BATHING: A LITTLE
STANDING UP FROM CHAIR USING ARMS: A LOT
MOVING TO AND FROM BED TO CHAIR: A LITTLE
TURNING FROM BACK TO SIDE WHILE IN FLAT BAD: A LITTLE
PATIENT BASELINE BEDBOUND: NO
TOILETING: A LITTLE
DAILY ACTIVITIY SCORE: 19
STANDING UP FROM CHAIR USING ARMS: A LITTLE
PERSONAL GROOMING: A LITTLE
DAILY ACTIVITIY SCORE: 19
HELP NEEDED FOR BATHING: A LITTLE
WALKING IN HOSPITAL ROOM: A LITTLE
DRESSING REGULAR UPPER BODY CLOTHING: A LITTLE
WALKING IN HOSPITAL ROOM: A LITTLE
CLIMB 3 TO 5 STEPS WITH RAILING: A LITTLE

## 2024-03-20 ASSESSMENT — ENCOUNTER SYMPTOMS
FEVER: 0
ABDOMINAL PAIN: 0
WEAKNESS: 0
COUGH: 0
ARTHRALGIAS: 1
BACK PAIN: 1
DIFFICULTY URINATING: 0
HEADACHES: 0
SHORTNESS OF BREATH: 0
CONSTIPATION: 0
NAUSEA: 0
CHILLS: 0
DIARRHEA: 0

## 2024-03-20 ASSESSMENT — PAIN DESCRIPTION - LOCATION: LOCATION: ABDOMEN

## 2024-03-20 ASSESSMENT — PAIN - FUNCTIONAL ASSESSMENT
PAIN_FUNCTIONAL_ASSESSMENT: 0-10
PAIN_FUNCTIONAL_ASSESSMENT: FLACC (FACE, LEGS, ACTIVITY, CRY, CONSOLABILITY)
PAIN_FUNCTIONAL_ASSESSMENT: 0-10
PAIN_FUNCTIONAL_ASSESSMENT: FLACC (FACE, LEGS, ACTIVITY, CRY, CONSOLABILITY)
PAIN_FUNCTIONAL_ASSESSMENT: 0-10
PAIN_FUNCTIONAL_ASSESSMENT: 0-10

## 2024-03-20 ASSESSMENT — COLUMBIA-SUICIDE SEVERITY RATING SCALE - C-SSRS
6. HAVE YOU EVER DONE ANYTHING, STARTED TO DO ANYTHING, OR PREPARED TO DO ANYTHING TO END YOUR LIFE?: NO
1. IN THE PAST MONTH, HAVE YOU WISHED YOU WERE DEAD OR WISHED YOU COULD GO TO SLEEP AND NOT WAKE UP?: NO
2. HAVE YOU ACTUALLY HAD ANY THOUGHTS OF KILLING YOURSELF?: NO

## 2024-03-20 ASSESSMENT — ACTIVITIES OF DAILY LIVING (ADL)
LACK_OF_TRANSPORTATION: NO
GROOMING: INDEPENDENT
ADEQUATE_TO_COMPLETE_ADL: YES
TOILETING: INDEPENDENT
HEARING - RIGHT EAR: FUNCTIONAL
HEARING - LEFT EAR: FUNCTIONAL
ASSISTIVE_DEVICE: OXYGEN
JUDGMENT_ADEQUATE_SAFELY_COMPLETE_DAILY_ACTIVITIES: YES
WALKS IN HOME: INDEPENDENT
BATHING: INDEPENDENT
PATIENT'S MEMORY ADEQUATE TO SAFELY COMPLETE DAILY ACTIVITIES?: YES
DRESSING YOURSELF: INDEPENDENT
FEEDING YOURSELF: INDEPENDENT

## 2024-03-20 ASSESSMENT — PAIN SCALES - GENERAL
PAINLEVEL_OUTOF10: 0 - NO PAIN
PAINLEVEL_OUTOF10: 7
PAINLEVEL_OUTOF10: 2
PAINLEVEL_OUTOF10: 2
PAINLEVEL_OUTOF10: 0 - NO PAIN
PAINLEVEL_OUTOF10: 0 - NO PAIN
PAINLEVEL_OUTOF10: 6
PAINLEVEL_OUTOF10: 0 - NO PAIN
PAINLEVEL_OUTOF10: 2
PAINLEVEL_OUTOF10: 0 - NO PAIN

## 2024-03-20 ASSESSMENT — PATIENT HEALTH QUESTIONNAIRE - PHQ9
SUM OF ALL RESPONSES TO PHQ9 QUESTIONS 1 & 2: 0
1. LITTLE INTEREST OR PLEASURE IN DOING THINGS: NOT AT ALL
2. FEELING DOWN, DEPRESSED OR HOPELESS: NOT AT ALL

## 2024-03-20 ASSESSMENT — LIFESTYLE VARIABLES
SKIP TO QUESTIONS 9-10: 0
HOW OFTEN DO YOU HAVE 6 OR MORE DRINKS ON ONE OCCASION: PATIENT DECLINED
AUDIT-C TOTAL SCORE: -1
HOW MANY STANDARD DRINKS CONTAINING ALCOHOL DO YOU HAVE ON A TYPICAL DAY: PATIENT DECLINED
HOW OFTEN DO YOU HAVE A DRINK CONTAINING ALCOHOL: NEVER
SUBSTANCE_ABUSE_PAST_12_MONTHS: NO
AUDIT-C TOTAL SCORE: -1
PRESCIPTION_ABUSE_PAST_12_MONTHS: NO

## 2024-03-20 NOTE — ANESTHESIA PROCEDURE NOTES
Airway  Date/Time: 3/20/2024 8:12 AM  Urgency: elective    Airway not difficult    Staffing  Performed: BRANDI   Authorized by: Isaac Moore MD    Performed by: BRANDI Chavez  Patient location during procedure: OR    Indications and Patient Condition  Indications for airway management: anesthesia  Spontaneous Ventilation: absent  Sedation level: deep  Preoxygenated: yes  Patient position: sniffing  Mask difficulty assessment: 1 - vent by mask  Planned trial extubation    Final Airway Details  Final airway type: endotracheal airway      Successful airway: ETT  Cuffed: yes   Successful intubation technique: direct laryngoscopy  Facilitating devices/methods: intubating stylet  Endotracheal tube insertion site: oral  Blade: Vin  Blade size: #3  ETT size (mm): 7.5  Cormack-Lehane Classification: grade III - view of epiglottis only  Placement verified by: capnometry   Measured from: lips  ETT to lips (cm): 20  Number of attempts at approach: 1

## 2024-03-20 NOTE — ANESTHESIA PROCEDURE NOTES
Peripheral IV  Date/Time: 3/20/2024 8:12 AM  Inserted by: BRANDI Chavez    Placement  Needle size: 20 G  Laterality: left  Location: hand  Site prep: alcohol  Technique: anatomical landmarks  Attempts: 1

## 2024-03-20 NOTE — ANESTHESIA PREPROCEDURE EVALUATION
Patient: Nisa Guzman    Procedure Information       Date/Time: 03/20/24 0800    Procedure: Gastric Sleeve Laparoscopic **PAT ON ADMIT**    Location: [facility] OR  / [facility]    Surgeons: Victorino Barry MD            Relevant Problems   Cardiovascular   (+) Essential hypertension   (+) Other hyperlipidemia      Endocrine   (+) Obesity, morbid (CMS/HCC)       Clinical information reviewed:   Tobacco  Allergies  Meds   Med Hx  Surg Hx  OB Status  Fam Hx  Soc   Hx        NPO Detail:  NPO/Void Status  Date of Last Liquid: 03/19/24  Time of Last Liquid: 2330  Date of Last Solid: 03/19/24  Time of Last Solid: 2330  Last Intake Type: Clear fluids  Time of Last Void: 0500         Physical Exam    Airway  Mallampati: II  TM distance: >3 FB  Neck ROM: full     Cardiovascular    Dental    Pulmonary    Abdominal            Anesthesia Plan    History of general anesthesia?: yes  History of complications of general anesthesia?: no    ASA 3     general     intravenous induction   Postoperative administration of opioids is intended.  Trial extubation is planned.  Anesthetic plan and risks discussed with patient.    Plan discussed with CAA.

## 2024-03-20 NOTE — PERIOPERATIVE NURSING NOTE
Patient received to Pacu [place] #4 from OR. Anesthesia at bedside. Report received. Initial Assessment complete. Pacu R.T at bedside Patient placed on CPAP. VSS on Cardiac Monitor. Continue to Monitor.

## 2024-03-20 NOTE — SIGNIFICANT EVENT
RT placed patient back on auto cpap after the patient was transported to the floors. IS was instructed and patient performed.

## 2024-03-20 NOTE — NURSING NOTE
RECEIVED PT FROM PACU VIA BED CPAP ON. ORIENTED TO ROOM CALL SYSTEM ETC.  AND SON AT BEDSIDE. IS GIVEN AND INSTRUCTED AND SCDS'S ON. TELE APPLIED

## 2024-03-20 NOTE — OP NOTE
Gastric Sleeve Laparoscopic **PAT ON ADMIT**, Lysis Adhesions Laparoscopy Abdominal Cavity Operative Note     Date: 3/20/2024  OR Location: [facility] OR    Name: Nisa Guzman, : 1955, Age: 68 y.o., MRN: [id number], Sex: female    Diagnosis  Pre-op Diagnosis     * Obesity, morbid (CMS/HCC) [E66.01]     * Obstructive sleep apnea [G47.33]     * Type 2 diabetes mellitus treated without insulin (CMS/HCC) [E11.9]     * Primary hypertension [I10]     * Hypercholesterolemia [E78.00]     * Metabolic syndrome [E88.810] Post-op Diagnosis     * Obesity, morbid (CMS/HCC) [E66.01]     * Obstructive sleep apnea [G47.33]     * Type 2 diabetes mellitus treated without insulin (CMS/HCC) [E11.9]     * Primary hypertension [I10]     * Hypercholesterolemia [E78.00]     * Metabolic syndrome [E88.810]     * Abdominal adhesions [K66.0]     Procedures  Gastric Sleeve Laparoscopic **PAT ON ADMIT**  50096 - [place] LAPS GSTRC RSTRICTIV PX LONGITUDINAL GASTRECTOMY    Lysis Adhesions Laparoscopy Abdominal Cavity  60343 - [place] LAPAROSCOPY ENTEROLYSIS SEPARATE PROCEDURE      Surgeons      * Victorino Barry - Primary    Resident/Fellow/Other Assistant:  Surgeon(s) and Role:    Procedure Summary  Anesthesia: General  ASA: III  Anesthesia Staff: Anesthesiologist: Isaac Moore MD  C-AA: BRANDI Chavez  Estimated Blood Loss: 0mL  Intra-op Medications:   Administrations occurring from 0800 to 1115 on 24:   Medication Name Total Dose   BUPivacaine HCl (Marcaine) 0.5 % (5 mg/mL) 30 mL, lidocaine (Xylocaine) 30 mL in sodium chloride 0.9 % 60 mL irrigation 120 mL   lactated Ringer's infusion 108.33 mL   ceFAZolin in 0.9% sodium chloride (Ancef) IVPB 3 g 3 g              Anesthesia Record               Intraprocedure I/O Totals          Intake    lactated Ringer's infusion 1500.00 mL    Total Intake 1500 mL          Specimen:   ID Type Source Tests Collected by Time   1 : STOMACH Tissue STOMACH GASTRECTOMY SURGICAL PATHOLOGY EXAM Victorino  MD Asha 3/20/2024 1034        Staff:   Circulator: Yelitza Putnam RN; Jessica Lee RN  Scrub Person: Penny Saavedra; Bernice Castillo  RNFA: Kalli Carty           Findings: massive hepatomegaly, significant intra-abdominal adhesions    Indications: Nisa Guzman is an 68 y.o. female that weighs 320 pounds and has a body mass index of 66.  She suffers from clinically significant morbid obesity, obstructive sleep apnea, type 2 diabetes, hypertension, hypercholesterolemia.  She has a known umbilical hernia.  She has failed maximal medical attempts weight loss prompting referral for surgical intervention.  Risks and benefits were explained in extensive detail prior to obtaining informed consent.  This disc included discussion about the differences between vertical sleeve gastrectomy and Duc-en-Y gastric bypass.  She wished to proceed with vertical sleeve gastrectomy.  Nisa was seen in the preoperative area. The risks, benefits, complications, treatment options, non-operative alternatives, expected recovery and outcomes were discussed with the patient. The possibilities of reaction to medication, pulmonary aspiration, injury to surrounding structures, bleeding, recurrent infection, the need for additional procedures, failure to diagnose a condition, and creating a complication requiring transfusion or operation were discussed with the patient. The patient concurred with the proposed plan, giving informed consent.  The site of surgery was properly noted/marked if necessary per policy. The patient has been actively warmed in preoperative area. Preoperative antibiotics have been ordered and given within 1 hours of incision. Venous thrombosis prophylaxis have been ordered including bilateral sequential compression devices and chemical prophylaxis    Procedure Details: Nisa was given antibiotics and subcutaneous heparin in the preoperative holding area.  She was brought to the operating room.   Sequential compression devices were placed.  She was participated in a preoperative huddle.  She was then given IV sedation and intubated.  She was placed in a split position.  Her abdomen is prepped and draped in a sterile fashion.  I entered the abdomen in the left midclavicular line 3 fingerbreadths below the costal margin with a 0 degree scope and a 5 mm trocar.  I insufflated the abdomen and switched an angled scope.  There was no trauma from entry.  There was massive intra-abdominal adhesions.  I placed another trocar where there was free space in the left upper abdomen.  I then lysed adhesions from 8:32AM to 9:02 AM.  This allowed me to place the remainder my trocars.  She had massive hepatomegaly.  She had additional adhesions in the right upper quadrant from a free previous open cholecystectomy.  I freed these up and mobilized the greater curvature of the stomach from the colon.  I then excised the fat pad over the gastroesophageal junction.  I mobilized the greater curvature of the stomach.  I freed up the retrogastric adhesions.  I defined the GE junction which was in the peritoneal cavity.  I had anesthesia give glucagon and I marked the stomach a centimeter from the angle of Hiss 3 cm from the incisura and 5 cm from the pylorus.  I had anesthesia pass a 38 [place] bougie and inflate the balloon.  I then placed the titan stapler along my marks.  I made sure not to narrow the incisura nor to create any spiraling of the stomach.  I went through the prefire and checklist.  I had them deflate the balloon and backup the bougie.  I fired my stapler.  I put the gastric remnant to the side.  I oversewed the entire staple line with a running 2-0 Vicryl.  I pexied the lower half of the stomach to the cut edge of the omentum with 2-0 Vicryl.  I was pleased with the shape of the stomach.  Was no bleeding.  I had them remove the bougie.  I removed the gastric remnant through the 19 mm fascial defect.  I closed this  defect with 3 interrupted 0 Vicryl sutures.  I closed the other 12 mm trocars with 0 Vicryl.  I performed bilateral rectus tap block with dilute Marcaine.  I removed the trocars under direct vision.  I irrigated subcutaneous tissue.  I closed the skin with 4-0 undyed Monocryl.  I placed Dermabond.  The patient's anesthetic was reversed, she was extubated, and brought to the recovery in stable condition.  All counts were correct.  She tolerated the procedure well.  She was placed on the hospital CPAP as she forgot her CPAP at home per protocol.  Complications:  None; patient tolerated the procedure well.    Disposition: PACU - hemodynamically stable.  Condition: stable     Victorino Barry  Phone Number: [phone]

## 2024-03-20 NOTE — ANESTHESIA POSTPROCEDURE EVALUATION
Patient: Nisa Guzman    Procedure Summary       Date: 03/20/24 Room / Location: [facility] OR 11 / [facility]    Anesthesia Start: 0801 Anesthesia Stop: 1057    Procedures:       Gastric Sleeve Laparoscopic **PAT ON ADMIT**      Lysis Adhesions Laparoscopy Abdominal Cavity Diagnosis:       Obesity, morbid (CMS/HCC)      Obstructive sleep apnea      Type 2 diabetes mellitus treated without insulin (CMS/HCC)      Primary hypertension      Hypercholesterolemia      Metabolic syndrome      Abdominal adhesions      (Obesity, morbid (CMS/HCC) [E66.01])    Surgeons: Victorino Barry MD Responsible Provider: Isaac Moore MD    Anesthesia Type: general ASA Status: 3            Anesthesia Type: general    Vitals Value Taken Time   /52 03/20/24 1105   Temp 36.2 03/20/24 1107   Pulse 77 03/20/24 1106   Resp 17 03/20/24 1106   SpO2 93 % 03/20/24 1106   Vitals shown include unvalidated device data.    Anesthesia Post Evaluation    Patient location during evaluation: PACU  Patient participation: complete - patient participated  Level of consciousness: awake  Pain management: adequate  Airway patency: patent  Cardiovascular status: acceptable  Respiratory status: acceptable  Hydration status: acceptable  Postoperative Nausea and Vomiting: none        There were no known notable events for this encounter.

## 2024-03-20 NOTE — NURSING NOTE
This nurse attempted to ambulate patient, Patient made it to the doorway of room Patient reported she started to feel light headed. Pulse ox 86%. Patient sitting up in chair. 2 L O2 applied. This nurse instructed patient to deep breath and cough, while splinting her abdomen. Pulse ox returned to 94% while on 2 L of O2.  Patient also educated to use the incentive spirometer. Patient used the incentive spirometer 10 times.

## 2024-03-21 ENCOUNTER — PHARMACY VISIT (OUTPATIENT)
Dept: PHARMACY | Facility: CLINIC | Age: 69
End: 2024-03-21
Payer: MEDICARE

## 2024-03-21 ENCOUNTER — APPOINTMENT (OUTPATIENT)
Dept: RADIOLOGY | Facility: HOSPITAL | Age: 69
DRG: 620 | End: 2024-03-21
Payer: COMMERCIAL

## 2024-03-21 VITALS
TEMPERATURE: 97.2 F | OXYGEN SATURATION: 99 % | SYSTOLIC BLOOD PRESSURE: 123 MMHG | DIASTOLIC BLOOD PRESSURE: 46 MMHG | HEIGHT: 58 IN | WEIGHT: 293 LBS | HEART RATE: 73 BPM | RESPIRATION RATE: 14 BRPM | BODY MASS INDEX: 61.5 KG/M2

## 2024-03-21 LAB
GLUCOSE BLD MANUAL STRIP-MCNC: 104 MG/DL (ref 74–99)
GLUCOSE BLD MANUAL STRIP-MCNC: 118 MG/DL (ref 74–99)
GLUCOSE BLD MANUAL STRIP-MCNC: 119 MG/DL (ref 74–99)
LABORATORY COMMENT REPORT: NORMAL
PATH REPORT.FINAL DX SPEC: NORMAL
PATH REPORT.GROSS SPEC: NORMAL
PATH REPORT.RELEVANT HX SPEC: NORMAL
PATH REPORT.TOTAL CANCER: NORMAL

## 2024-03-21 PROCEDURE — RXMED WILLOW AMBULATORY MEDICATION CHARGE

## 2024-03-21 PROCEDURE — 82947 ASSAY GLUCOSE BLOOD QUANT: CPT

## 2024-03-21 PROCEDURE — 2550000001 HC RX 255 CONTRASTS: Performed by: SURGERY

## 2024-03-21 PROCEDURE — 74240 X-RAY XM UPR GI TRC 1CNTRST: CPT

## 2024-03-21 PROCEDURE — 2500000002 HC RX 250 W HCPCS SELF ADMINISTERED DRUGS (ALT 637 FOR MEDICARE OP, ALT 636 FOR OP/ED): Performed by: INTERNAL MEDICINE

## 2024-03-21 PROCEDURE — 94660 CPAP INITIATION&MGMT: CPT

## 2024-03-21 PROCEDURE — C9113 INJ PANTOPRAZOLE SODIUM, VIA: HCPCS | Performed by: SURGERY

## 2024-03-21 PROCEDURE — 2500000005 HC RX 250 GENERAL PHARMACY W/O HCPCS: Performed by: INTERNAL MEDICINE

## 2024-03-21 PROCEDURE — 2500000004 HC RX 250 GENERAL PHARMACY W/ HCPCS (ALT 636 FOR OP/ED): Performed by: SURGERY

## 2024-03-21 PROCEDURE — 2500000001 HC RX 250 WO HCPCS SELF ADMINISTERED DRUGS (ALT 637 FOR MEDICARE OP): Performed by: INTERNAL MEDICINE

## 2024-03-21 PROCEDURE — 74246 X-RAY XM UPR GI TRC 2CNTRST: CPT | Performed by: RADIOLOGY

## 2024-03-21 PROCEDURE — 99232 SBSQ HOSP IP/OBS MODERATE 35: CPT | Performed by: INTERNAL MEDICINE

## 2024-03-21 RX ORDER — ENOXAPARIN SODIUM 100 MG/ML
40 INJECTION SUBCUTANEOUS EVERY 12 HOURS SCHEDULED
Qty: 60 EACH | Refills: 0 | Status: SHIPPED | OUTPATIENT
Start: 2024-03-21 | End: 2024-04-20

## 2024-03-21 RX ORDER — OXYCODONE HCL 5 MG/5 ML
5 SOLUTION, ORAL ORAL EVERY 6 HOURS PRN
Qty: 60 ML | Refills: 0 | Status: SHIPPED | OUTPATIENT
Start: 2024-03-21 | End: 2024-03-24

## 2024-03-21 RX ORDER — ACETAMINOPHEN 500MG/15ML
650 LIQUID (ML) ORAL EVERY 4 HOURS PRN
Qty: 237 ML | Refills: 2 | Status: SHIPPED | OUTPATIENT
Start: 2024-03-21

## 2024-03-21 RX ORDER — OMEPRAZOLE 40 MG/1
40 CAPSULE, DELAYED RELEASE ORAL
Qty: 30 CAPSULE | Refills: 2 | Status: SHIPPED | OUTPATIENT
Start: 2024-03-21

## 2024-03-21 RX ADMIN — DULOXETINE HYDROCHLORIDE 60 MG: 60 CAPSULE, DELAYED RELEASE ORAL at 11:59

## 2024-03-21 RX ADMIN — ACETAMINOPHEN 1000 MG: 10 INJECTION INTRAVENOUS at 02:17

## 2024-03-21 RX ADMIN — AMLODIPINE BESYLATE 5 MG: 5 TABLET ORAL at 11:59

## 2024-03-21 RX ADMIN — FLUOXETINE HYDROCHLORIDE 20 MG: 20 LIQUID ORAL at 11:59

## 2024-03-21 RX ADMIN — METOCLOPRAMIDE HYDROCHLORIDE 10 MG: 5 INJECTION INTRAMUSCULAR; INTRAVENOUS at 05:37

## 2024-03-21 RX ADMIN — ENOXAPARIN SODIUM 40 MG: 40 INJECTION SUBCUTANEOUS at 09:19

## 2024-03-21 RX ADMIN — PANTOPRAZOLE SODIUM 40 MG: 40 INJECTION, POWDER, FOR SOLUTION INTRAVENOUS at 09:20

## 2024-03-21 RX ADMIN — ACETAMINOPHEN 1000 MG: 10 INJECTION INTRAVENOUS at 09:20

## 2024-03-21 RX ADMIN — METOPROLOL TARTRATE 5 MG: 5 INJECTION INTRAVENOUS at 04:26

## 2024-03-21 RX ADMIN — METOPROLOL TARTRATE 5 MG: 5 INJECTION INTRAVENOUS at 09:20

## 2024-03-21 RX ADMIN — IOHEXOL 22.5 ML: 350 INJECTION, SOLUTION INTRAVENOUS at 08:24

## 2024-03-21 RX ADMIN — SODIUM CHLORIDE, SODIUM LACTATE, POTASSIUM CHLORIDE, AND CALCIUM CHLORIDE 125 ML/HR: 600; 310; 30; 20 INJECTION, SOLUTION INTRAVENOUS at 11:21

## 2024-03-21 RX ADMIN — LOSARTAN POTASSIUM 100 MG: 100 TABLET, FILM COATED ORAL at 11:59

## 2024-03-21 SDOH — ECONOMIC STABILITY: HOUSING INSECURITY
IN THE LAST 12 MONTHS, WAS THERE A TIME WHEN YOU DID NOT HAVE A STEADY PLACE TO SLEEP OR SLEPT IN A SHELTER (INCLUDING NOW)?: NO

## 2024-03-21 SDOH — SOCIAL STABILITY: SOCIAL INSECURITY: WITHIN THE LAST YEAR, HAVE YOU BEEN AFRAID OF YOUR PARTNER OR EX-PARTNER?: NO

## 2024-03-21 SDOH — HEALTH STABILITY: MENTAL HEALTH: HOW MANY STANDARD DRINKS CONTAINING ALCOHOL DO YOU HAVE ON A TYPICAL DAY?: PATIENT DOES NOT DRINK

## 2024-03-21 SDOH — HEALTH STABILITY: MENTAL HEALTH: HOW OFTEN DO YOU HAVE A DRINK CONTAINING ALCOHOL?: NEVER

## 2024-03-21 SDOH — SOCIAL STABILITY: SOCIAL NETWORK: HOW OFTEN DO YOU ATTEND CHURCH OR RELIGIOUS SERVICES?: NEVER

## 2024-03-21 SDOH — ECONOMIC STABILITY: FOOD INSECURITY: WITHIN THE PAST 12 MONTHS, THE FOOD YOU BOUGHT JUST DIDN'T LAST AND YOU DIDN'T HAVE MONEY TO GET MORE.: NEVER TRUE

## 2024-03-21 SDOH — HEALTH STABILITY: PHYSICAL HEALTH: ON AVERAGE, HOW MANY MINUTES DO YOU ENGAGE IN EXERCISE AT THIS LEVEL?: 0 MIN

## 2024-03-21 SDOH — ECONOMIC STABILITY: FOOD INSECURITY: WITHIN THE PAST 12 MONTHS, YOU WORRIED THAT YOUR FOOD WOULD RUN OUT BEFORE YOU GOT MONEY TO BUY MORE.: NEVER TRUE

## 2024-03-21 SDOH — SOCIAL STABILITY: SOCIAL NETWORK: ARE YOU MARRIED, WIDOWED, DIVORCED, SEPARATED, NEVER MARRIED, OR LIVING WITH A PARTNER?: MARRIED

## 2024-03-21 SDOH — SOCIAL STABILITY: SOCIAL NETWORK: HOW OFTEN DO YOU ATTENT MEETINGS OF THE CLUB OR ORGANIZATION YOU BELONG TO?: NEVER

## 2024-03-21 SDOH — HEALTH STABILITY: MENTAL HEALTH: HOW OFTEN DO YOU HAVE 6 OR MORE DRINKS ON ONE OCCASION?: NEVER

## 2024-03-21 SDOH — ECONOMIC STABILITY: INCOME INSECURITY: IN THE PAST 12 MONTHS, HAS THE ELECTRIC, GAS, OIL, OR WATER COMPANY THREATENED TO SHUT OFF SERVICE IN YOUR HOME?: NO

## 2024-03-21 SDOH — SOCIAL STABILITY: SOCIAL NETWORK: HOW OFTEN DO YOU GET TOGETHER WITH FRIENDS OR RELATIVES?: THREE TIMES A WEEK

## 2024-03-21 SDOH — ECONOMIC STABILITY: TRANSPORTATION INSECURITY
IN THE PAST 12 MONTHS, HAS THE LACK OF TRANSPORTATION KEPT YOU FROM MEDICAL APPOINTMENTS OR FROM GETTING MEDICATIONS?: NO

## 2024-03-21 SDOH — SOCIAL STABILITY: SOCIAL INSECURITY: WITHIN THE LAST YEAR, HAVE YOU BEEN HUMILIATED OR EMOTIONALLY ABUSED IN OTHER WAYS BY YOUR PARTNER OR EX-PARTNER?: NO

## 2024-03-21 SDOH — HEALTH STABILITY: PHYSICAL HEALTH: ON AVERAGE, HOW MANY DAYS PER WEEK DO YOU ENGAGE IN MODERATE TO STRENUOUS EXERCISE (LIKE A BRISK WALK)?: 0 DAYS

## 2024-03-21 SDOH — ECONOMIC STABILITY: INCOME INSECURITY: IN THE LAST 12 MONTHS, WAS THERE A TIME WHEN YOU WERE NOT ABLE TO PAY THE MORTGAGE OR RENT ON TIME?: NO

## 2024-03-21 SDOH — ECONOMIC STABILITY: INCOME INSECURITY: HOW HARD IS IT FOR YOU TO PAY FOR THE VERY BASICS LIKE FOOD, HOUSING, MEDICAL CARE, AND HEATING?: NOT HARD AT ALL

## 2024-03-21 SDOH — ECONOMIC STABILITY: HOUSING INSECURITY: IN THE LAST 12 MONTHS, HOW MANY PLACES HAVE YOU LIVED?: 1

## 2024-03-21 SDOH — ECONOMIC STABILITY: TRANSPORTATION INSECURITY
IN THE PAST 12 MONTHS, HAS LACK OF TRANSPORTATION KEPT YOU FROM MEETINGS, WORK, OR FROM GETTING THINGS NEEDED FOR DAILY LIVING?: NO

## 2024-03-21 ASSESSMENT — COGNITIVE AND FUNCTIONAL STATUS - GENERAL
MOBILITY SCORE: 23
DAILY ACTIVITIY SCORE: 24
CLIMB 3 TO 5 STEPS WITH RAILING: A LITTLE
CLIMB 3 TO 5 STEPS WITH RAILING: A LITTLE
DAILY ACTIVITIY SCORE: 24
MOBILITY SCORE: 23

## 2024-03-21 ASSESSMENT — ENCOUNTER SYMPTOMS
CONSTITUTIONAL NEGATIVE: 1
EYES NEGATIVE: 1
RESPIRATORY NEGATIVE: 1
NEUROLOGICAL NEGATIVE: 1
GASTROINTESTINAL NEGATIVE: 1
PSYCHIATRIC NEGATIVE: 1
MUSCULOSKELETAL NEGATIVE: 1
HEMATOLOGIC/LYMPHATIC NEGATIVE: 1
ALLERGIC/IMMUNOLOGIC NEGATIVE: 1
CARDIOVASCULAR NEGATIVE: 1
ENDOCRINE NEGATIVE: 1

## 2024-03-21 ASSESSMENT — PAIN - FUNCTIONAL ASSESSMENT: PAIN_FUNCTIONAL_ASSESSMENT: 0-10

## 2024-03-21 ASSESSMENT — LIFESTYLE VARIABLES
SKIP TO QUESTIONS 9-10: 1
AUDIT-C TOTAL SCORE: 0

## 2024-03-21 ASSESSMENT — ACTIVITIES OF DAILY LIVING (ADL): LACK_OF_TRANSPORTATION: NO

## 2024-03-21 ASSESSMENT — PAIN SCALES - GENERAL: PAINLEVEL_OUTOF10: 0 - NO PAIN

## 2024-03-21 NOTE — PROGRESS NOTES
Dietary Rounds     Nisa was sitting up in the recliner chair upon arrival. She has consumed 40 oz of fluids so far and continues to report tolerating all fluids well. Nisa reports she is also continuing to walk and use the IS at least every hour. I encouraged Nisa to continue taking small, frequent sips of fluid and provided her another 20 oz water bottle. Pt showed good understanding and had no questions for me.      Keiry Herndon, EVERARDO, LDN  Registered Dietitian, Licensed Dietitian Nutritionist

## 2024-03-21 NOTE — DISCHARGE SUMMARY
Discharge Diagnosis  Obesity, morbid (CMS/HCC)    Issues Requiring Follow-Up  Follow up with Dr. Barry as per previously scheduled    Test Results Pending At Discharge  Pending Labs       No current pending labs.            Hospital Course   3/20/24 Underwent laparoscopic sleeve gastrectomy, and lysis of adhesions.     Pertinent Physical Exam At Time of Discharge  Physical Exam  Constitutional:       Appearance: Normal appearance.   HENT:      Head: Normocephalic and atraumatic.      Right Ear: Tympanic membrane normal.      Nose: Nose normal.      Mouth/Throat:      Mouth: Mucous membranes are moist.   Eyes:      Pupils: Pupils are equal, round, and reactive to light.   Cardiovascular:      Rate and Rhythm: Normal rate and regular rhythm.      Pulses: Normal pulses.   Pulmonary:      Effort: Pulmonary effort is normal.      Breath sounds: Normal breath sounds.   Abdominal:      General: Bowel sounds are normal. There is no distension.      Tenderness: There is no abdominal tenderness. There is no guarding.      Hernia: No hernia is present.      Comments: Abdominal incisions are CDI   Genitourinary:     Rectum: Normal.   Musculoskeletal:         General: Normal range of motion.   Skin:     General: Skin is warm and dry.   Neurological:      General: No focal deficit present.      Mental Status: She is alert.   Psychiatric:         Mood and Affect: Mood normal.         Behavior: Behavior normal.         Home Medications     Medication List      START taking these medications     acetaminophen 160 mg/5 mL elixir; Commonly known as: Tylenol; Take 20.3   mL (650 mg) by mouth every 4 hours if needed for moderate pain (4 - 6).   enoxaparin 40 mg/0.4 mL syringe; Commonly known as: Lovenox; Inject 0.4   mL (40 mg) under the skin every 12 hours.   FLUoxetine 20 mg/5 mL (4 mg/mL) solution; Commonly known as: PROzac;   Take 5 mL (20 mg) by mouth once daily. Do not start before March 21, 2024.; Replaces: FLUoxetine 20 mg  capsule   oxyCODONE 5 mg/5 mL solution; Commonly known as: Roxicodone; Take 5 mL   (5 mg) by mouth every 6 hours if needed for severe pain (7 - 10) or   moderate pain (4 - 6) for up to 3 days.     CHANGE how you take these medications     * omeprazole 40 mg DR capsule; Commonly known as: PriLOSEC; What   changed: Another medication with the same name was added. Make sure you   understand how and when to take each.   * omeprazole 40 mg DR capsule; Commonly known as: PriLOSEC; Take 1   capsule (40 mg) by mouth once daily in the morning. Take before meals. Do   not crush or chew.; What changed: You were already taking a medication   with the same name, and this prescription was added. Make sure you   understand how and when to take each.  * This list has 2 medication(s) that are the same as other medications   prescribed for you. Read the directions carefully, and ask your doctor or   other care provider to review them with you.     CONTINUE taking these medications     albuterol 90 mcg/actuation inhaler   amLODIPine 5 mg tablet; Commonly known as: Norvasc; Take 1 tablet (5 mg)   by mouth 2 times a day.   atorvastatin 10 mg tablet; Commonly known as: Lipitor   cyclobenzaprine 10 mg tablet; Commonly known as: Flexeril   DULoxetine 60 mg DR capsule; Commonly known as: Cymbalta   losartan 100 mg tablet; Commonly known as: Cozaar   metFORMIN 500 mg tablet; Commonly known as: Glucophage   montelukast 10 mg tablet; Commonly known as: Singulair     STOP taking these medications     aspirin 81 mg chewable tablet   calcium carbonate 500 mg calcium (1,250 mg) tablet; Commonly known as:   Oscal   CENTRUM WOMEN ORAL   cholecalciferol 25 MCG (1000 UT) capsule; Commonly known as: Vitamin D-3   FLUoxetine 20 mg capsule; Commonly known as: PROzac; Replaced by:   FLUoxetine 20 mg/5 mL (4 mg/mL) solution   PRESERVISION AREDS ORAL       Outpatient Follow-Up  Future Appointments   Date Time Provider Department Center   4/1/2024 11:00 AM  SELENA Rodgers-CNP [id number] [facility]   4/15/2024  9:00 AM SELENA Rodgers-CNP [id number] [facility]   4/29/2024  3:30 PM SELENA Rodgers-CNP [id number] [facility]   7/9/2024  1:15 PM [id number]       Kim Randhawa, APRN-CNP

## 2024-03-21 NOTE — PROGRESS NOTES
03/21/24 1004   Discharge Planning   Living Arrangements Spouse/significant other   Support Systems Spouse/significant other   Type of Residence Private residence   Number of Stairs to Enter Residence 2   Number of Stairs Within Residence 0   Do you have animals or pets at home? No   Who is requesting discharge planning? Provider   Home or Post Acute Services None   Patient expects to be discharged to: Home   Does the patient need discharge transport arranged? No   Financial Resource Strain   How hard is it for you to pay for the very basics like food, housing, medical care, and heating? Not hard   Housing Stability   In the last 12 months, was there a time when you were not able to pay the mortgage or rent on time? N   In the last 12 months, how many places have you lived? 1   In the last 12 months, was there a time when you did not have a steady place to sleep or slept in a shelter (including now)? N   Transportation Needs   In the past 12 months, has lack of transportation kept you from medical appointments or from getting medications? no   In the past 12 months, has lack of transportation kept you from meetings, work, or from getting things needed for daily living? No   Patient Choice   Patient / Family choosing to utilize agency / facility established prior to hospitalization No

## 2024-03-21 NOTE — NURSING NOTE
Pt A&O x3 currently resting in bed. No complaints or concerns. Call light within reach.  Pt npo per order. Pt continues on IV fluids per order.  Pt encouraged to walk the hallway and sit in chair.

## 2024-03-21 NOTE — DISCHARGE INSTRUCTIONS
Home-going instructions for gastric sleeve  #1 Fluids             drink 50 - 65 oz of non-caffeinated fluids daily  #2 Walk               walk 3 - 5 minutes every hour during the day  #3 Spirometer   use the incentive spirometer 10 times and hour during the day  #4 Protein          after drinking 50oz of fluids begin drinking protein    Wound Care  Do not submerge incisions in pool, bath, or hot tub  Do not peel off Dermabond -it will gradually loosen and fall off  You may shower and pat incisions dry  Do not apply any lotions, creams, or ointments to your incisions  Remove the dressing above your belly button seven days after it has been on  Activity  Do not push, pull, or lift.  Avoid excessive bending and twisting at the waist.  You may walk stairs.  You may sleep in any position that feels comfortable.  You must get up and walk every hour during the day.  If you plan to take a nap during the day, set an alarm for one hour so you will get up and walk around.  Potential Complications   Wound Infection - redness, increased warmth, pain, thick drainage, fever  Blood Clot/Pulmonary Embolism - calf pain or swelling, chest pain, shortness of breath, racing heart, fast breathing  Leak - abdominal pain radiating down the left side (after eating/drinking), fever, fast breathing, or rapid heart rate.   CALL 9-1-1 WITH ANY CHEST PAIN OR SHORTNESS OF BREATH, otherwise call the office with any concerns. [phone]  Medications  All medications need to be crushable, chewable, in liquid form, or open capsules.   CANNOT crush extended release meds.  You must begin taking your stomach acid reducing medication the morning after you are discharged from the hospital.  Start taking your chewable or liquid multivitamin tomorrow.  Diet  Full liquid.  NO CARBONATION.  Must be thin enough to go up a small stirrer-type straw. BUT DO NOT USE STRAWS.  Remember your first goal is to drink at least 50 oz. fluid every day.  Your second  goal is to drink a minimum of 50 grams of protein every day.  Refer to your manual for explicit instructions.     Follow-up  2 weeks    Vertical Sleeve Gastrectomy Discharge Instructions    About this topic  This surgery is done to treat obesity. You will eat less food after your doctor makes your stomach smaller. Then, you will lose weight.    What care is needed at home?  Ask your doctor what you need to do when you go home. Make sure you ask questions if you do not understand what the doctor says. This way you will know what you need to do.  Talk to your doctor about how to care for your cut site. Ask your doctor about:  When you should change your bandages  When you may take a bath or shower  If you need to be careful with lifting things over 10 pounds (4.5 kg)  When you may go back to your normal activities like work or driving  Be sure to wash your hands before and after touching your wound or dressing.  Your doctor may have you wear special stockings. These will help prevent blood clots.  Drink water slowly. To help with belly fullness, do not take fluids when you eat. Drink 30 minutes before or after meals.  Ask your doctor about taking products that contain aspirin or ibuprofen. These drugs could bother your stomach or cause bleeding.  Keep a food journal. Take note of what you eat. Learn how to count calories. Foods may give you an upset stomach after the procedure. Write down those that make you throw up or have loose stools.  What follow-up care is needed?  Your doctor may ask you to make several visits to the office to check on your progress. Be sure to keep these visits.  If you have stitches or staples, you will need to have them taken out. Your doctor will often want to do this in 1 to 2 weeks. If the doctor used skin glue, the glue will fall off on its own.  What lifestyle changes are needed?  Start an exercise program when cleared by your doctor.  Stay away from beer, wine, and mixed drinks  (alcohol). Drinking them can cause serious health problems after surgery. Avoid sodas and drinks that are carbonated.  If you are a smoker, stop smoking.  Your body will adjust to the smaller amount of food you eat. You may feel tired, anxious, and depressed. Keep yourself active and busy while you get used to this change. Get help from your counselor or doctor if your feelings are getting in the way of your daily routine.  What drugs may be needed?  The doctor may order drugs to:  Help with pain  Fight an infection  Prevent gallstones  Lessen stomach acid  Your doctor will talk to you about the food and mineral supplements that you need after surgery. Chewable vitamins may be easier for you to take and are absorbed more easily. You may need to take them 2 times each day.  Check with the pharmacist or your doctor about your medicines. Liquid medicines are easiest to take. Some medicines can be crushed into a fine powder and mixed with water. Other medicines are long acting or sustained release and cannot be crushed.  Will physical activity be limited?  You may need to limit your activity for a while. Talk with your doctor about the right amount of activity for you.  Once you are feeling better, start with a daily walk of 5 or 10 minutes and slowly add to the amount of time that you walk. As you become more in shape, ask your doctor about exercises to build your strength.  Regular exercise must become a part of your daily routine for you to lose weight.  What changes to diet are needed?  It is very important to follow the diet plan given to you by your doctor. The doctor may want you to eat pureed food for the first 6 weeks after the procedure. The doctor may want you to eat small meals more often (4 to 6 each day). Each meal will be about 2 ounces (60 grams) of food.  When you are allowed to eat solid food, chew your food properly. Eat slowly and take small bites.  Eat a healthy balanced diet with enough  protein.  Avoid foods high in sugar. These can cause upset stomach and bloating and lower your blood sugar later.  Drink water slowly.  What problems could happen?  Infection  Bleeding or blood clots  Signs of low blood sugar. These include anger, shaking, a fast heartbeat, confusion, or sweating.  Leaks or blocks where the bowels were sewn together  Throwing up if you eat too much or too fast  Stitches or staples may loosen  Gallstones or kidney stones  Loss of fluids from not drinking enough water  Short-term hair loss from not eating enough protein  When do I need to call the doctor?  Signs of infection. These include a fever of 100.4°F (38°C) or higher, chills, pain with passing urine.  Signs of wound infection. These include swelling, redness, warmth around the wound; too much pain when touched; yellowish, greenish, or bloody discharge; foul smell coming from the cut site; cut site opens up.  Very bad belly pain  Upset stomach and throwing up  Hard stools  Blood in your stool  Low mood  You are not feeling better in 2 to 3 days or you are feeling worse  Helpful tips  Gastric bypass alone is not a solution to lose weight. Control what you eat and exercise to have success in losing weight and keeping the weight off.  When you weigh yourself, do it in the morning in your underwear.  Put a pillow on your belly when you cough to lessen the pain.  You may feel a lot of changes in your body. A woman's menstrual cycle may change after the surgery. Your skin and hair may feel brittle or fall out if you don't eat enough protein-rich food. Talk to your doctor about these changes.  Continue to work with your doctor for any other health problems. Your diabetes and high blood pressure may improve. Your doctor may need to change how much of your drugs you are taking as you lose weight.  Teach Back: Helping You Understand  The Teach Back Method helps you understand the information we are giving you. After you talk with the  staff, tell them in your own words what you learned. This helps to make sure the staff has described each thing clearly. It also helps to explain things that may have been confusing. Before going home, make sure you can do these:  I can tell you about my procedure.  I can tell you how to care for my cut site.  I can tell you what changes I need to make with my diet or activities.  I can tell you what I will do if I have a fever, chills, belly pain, upset stomach, or throwing up.  Last Reviewed Date  2022-06-24  Consumer Information Use and Disclaimer  This generalized information is a limited summary of diagnosis, treatment, and/or medication information. It is not meant to be comprehensive and should be used as a tool to help the user understand and/or assess potential diagnostic and treatment options. It does NOT include all information about conditions, treatments, medications, side effects, or risks that may apply to a specific patient. It is not intended to be medical advice or a substitute for the medical advice, diagnosis, or treatment of a health care provider based on the health care provider's examination and assessment of a patient’s specific and unique circumstances. Patients must speak with a health care provider for complete information about their health, medical questions, and treatment options, including any risks or benefits regarding use of medications. This information does not endorse any treatments or medications as safe, effective, or approved for treating a specific patient. [employer] Inc[employer] and its affiliates disclaim any warranty or liability relating to this information or the use thereof. The use of this information is governed by the Terms of Use, available at https://www.woltersAcadiaSoftuwer.com/en/know/clinical-effectiveness-terms  Copyright © 2024 [employer]Date, Inc[employer] and its affiliates and/or licensors. All rights reserved.

## 2024-03-21 NOTE — CONSULTS
"Consults    Reason For Consult  Obstructive sleep apnea    History Of Present Illness  Nisa Guzman is a 68 y.o. female who is postop day #1 after laparoscopic gastric sleeve, lysis of adhesions abdominal cavity via Dr. Barry.  She has a known history of obstructive sleep apnea and is compliant with CPAP nightly however states that sometimes her mask \"falls off\" during the night.  We were asked to see the patient in this regard.     Past Medical History  Past Medical History:   Diagnosis Date    Arthritis     Asthma     Hyperlipidemia     Hypertension     Sleep apnea        Surgical History  Past Surgical History:   Procedure Laterality Date    CHOLECYSTECTOMY          Social History  Social History     Tobacco Use    Smoking status: Never     Passive exposure: Never    Smokeless tobacco: Never   Vaping Use    Vaping Use: Never used   Substance Use Topics    Alcohol use: Never    Drug use: Never       Family History  Family History   Problem Relation Name Age of Onset    Obesity Mother          morbid    Diabetes Mother      Hypertension Mother      Lung cancer Mother      Hypertension Father      Melanoma Father      Stroke Father      Heart attack Father              Allergies  Cortisone, Naproxen sodium, Penicillin, and Adhesive    Review of Systems   Constitutional: Negative.    HENT: Negative.     Eyes: Negative.    Respiratory: Negative.     Cardiovascular: Negative.    Gastrointestinal: Negative.    Endocrine: Negative.    Genitourinary: Negative.    Musculoskeletal: Negative.    Allergic/Immunologic: Negative.    Neurological: Negative.    Hematological: Negative.    Psychiatric/Behavioral: Negative.       Subjective  On 2 L nasal cannula oxygen; O2 sats 97%.  No respiratory complaints.  Denies pain.  Afebrile.     Physical Exam  General: Awake and alert, no acute distress.  Pulmonary: Respirations equal and unlabored.  Cardiac: Heart rate and rhythm regular.  Neurologic: Oriented person, place, " "date/time.    Vital Signs  Blood pressure 132/78, pulse 79, temperature 36.4 °C (97.5 °F), temperature source Axillary, resp. rate 14, height 1.48 m (4' 10.27\"), weight 144 kg (318 lb 5.5 oz), SpO2 100 %.    Oxygen Therapy  SpO2: 100 %  Medical Gas Therapy: Supplemental oxygen  O2 Delivery Method: CPAP/Bi-PAP mask       Impression  Status post laparoscopic gastric sleeve, lysis of adhesions abdominal cavity  Obstructive sleep apnea  Morbid obesity    Plan  Wean to room air as ordered  CPAP nightly  Continuous pulse oximetry  Incentive spirometry/pulmonary hygiene  PT/OT/out of bed  Patient has a known history of obstructive sleep apnea and is compliant with CPAP nightly   Reviewed the risks of untreated sleep apnea with patient and she verbalized understanding  Provided patient with contact information to office and encouraged her to follow-up upon discharge to address ill fitting mask and she agreed  Reviewed with collaborating physician Dr. Munguia  Will sign off, please reconsult if needed  Thank you for this consultation    Meeta Carlos, APRN-CNP  [facility]  "

## 2024-03-21 NOTE — PROGRESS NOTES
03/21/24 1005   [facility] Disability Status   Are you deaf or do you have serious difficulty hearing? N   Are you blind or do you have serious difficulty seeing, even when wearing glasses? N   Because of a physical, mental, or emotional condition, do you have serious difficulty concentrating, remembering, or making decisions? (5 years old or older) N   Do you have serious difficulty walking or climbing stairs? N   Do you have serious difficulty dressing or bathing? N   Because of a physical, mental, or emotional condition, do you have serious difficulty doing errands alone such as visiting the doctor? N

## 2024-03-21 NOTE — PROGRESS NOTES
03/21/24 1003   Physical Activity   On average, how many days per week do you engage in moderate to strenuous exercise (like a brisk walk)? 0 days   On average, how many minutes do you engage in exercise at this level? 0 min   Financial Resource Strain   How hard is it for you to pay for the very basics like food, housing, medical care, and heating? Not hard   Housing Stability   In the last 12 months, was there a time when you were not able to pay the mortgage or rent on time? N   In the last 12 months, how many places have you lived? 1   In the last 12 months, was there a time when you did not have a steady place to sleep or slept in a shelter (including now)? N   Transportation Needs   In the past 12 months, has lack of transportation kept you from medical appointments or from getting medications? no   In the past 12 months, has lack of transportation kept you from meetings, work, or from getting things needed for daily living? No   Food Insecurity   Within the past 12 months, you worried that your food would run out before you got the money to buy more. Never true   Within the past 12 months, the food you bought just didn't last and you didn't have money to get more. Never true   Stress   Do you feel stress - tense, restless, nervous, or anxious, or unable to sleep at night because your mind is troubled all the time - these days? Not at all   Social Connections   In a typical week, how many times do you talk on the phone with family, friends, or neighbors? More than 3   How often do you get together with friends or relatives? Three times   How often do you attend Moravian or Quaker services? Never   Do you belong to any clubs or organizations such as Moravian groups, unions, fraternal or athletic groups, or school groups? No   How often do you attend meetings of the clubs or organizations you belong to? Never   Are you , , , , never , or living with a partner?     Intimate Partner Violence   Within the last year, have you been afraid of your partner or ex-partner? No   Within the last year, have you been humiliated or emotionally abused in other ways by your partner or ex-partner? No   Within the last year, have you been kicked, hit, slapped, or otherwise physically hurt by your partner or ex-partner? No   Within the last year, have you been raped or forced to have any kind of sexual activity by your partner or ex-partner? No   Alcohol Use   Q1: How often do you have a drink containing alcohol? Never   Q2: How many drinks containing alcohol do you have on a typical day when you are drinking? None   Q3: How often do you have six or more drinks on one occasion? Never   Utilities   In the past 12 months has the electric, gas, oil, or water company threatened to shut off services in your home? No

## 2024-03-21 NOTE — PROGRESS NOTES
Dietary Rounds and Nutrition Education    Nisa was sitting up in the chair upon arrival. She reports tolerating all fluid well and has consumed ~8 oz so far. Nisa reports she is walking and using the IS every hour. Answered pt's and her husbands questions.     Thin liquid diet education was provided and packet was given to patient. Reviewed the importance of consuming at least 50 oz of fluid per day once discharged. Patient was instructed to start protein shake tomorrow evening once 40-50 oz of fluid was consumed. Informed patient to gradually work way up to 50g protein per day. Also reviewed things to avoid at this time including carbonation, alcohol, sugar, jello, straws, and gum. Patient was instructed to follow liquid diet for a full 2 weeks, and to not progress diet until instructed to do so. Patient shows good understanding of education provided.        Keiry Herndon RD, LDN  Registered Dietitian, Licensed Dietitian Nutritionist

## 2024-03-21 NOTE — PROGRESS NOTES
"Nisa Guzman is a 68 y.o. female on day 1 of admission presenting with Obesity, morbid (CMS/HCC).    Subjective   Feels great.  Tolerating liquids.  Would like to go home.       Objective     Physical Exam  Abdomen soft, incisions dry, nontender  Last Recorded Vitals  Blood pressure (!) 123/46, pulse 73, temperature 36.2 °C (97.2 °F), temperature source Oral, resp. rate 14, height 1.48 m (4' 10.27\"), weight 144 kg (318 lb 5.5 oz), SpO2 99 %.  Intake/Output last 3 Shifts:  I/O last 3 completed shifts:  In: 3843.7 (26.6 mL/kg) [I.V.:3543.7 (24.5 mL/kg); IV Piggyback:300]  Out: 575 (4 mL/kg) [Urine:575 (0.1 mL/kg/hr)]  Weight: 144.4 kg     Relevant Results     FL upper GI w KUB  Status: Final result     PACS Images     Show images for FL upper GI w KUB  Signed by    Signed Time Phone Pager   Kimber Tran MD 3/21/2024 09:16 [phone]      Exam Information    Status Exam Begun Exam Ended   Final 3/21/2024 08:02 3/21/2024 08:53     Study Result    Narrative & Impression   Interpreted By:  Kimber Tran,   STUDY:  FL UPPER GI W KUB 3/21/2024 8:53 am      INDICATION:  Postoperative evaluation following vertical sleeve gastrectomy      COMPARISON:  None available.      ACCESSION NUMBER(S):  [id number]      ORDERING CLINICIAN:  ALMITA MUNOZ      TECHNIQUE:  Preliminary erect frontal and lateral views of the upper abdomen are  performed. Water-soluble upper gastrointestinal examination was  completed.      Fluoroscopic time: 38 seconds      Air kerma: 218.1 mGy      FINDINGS:  Preliminary abdominal films show surgical clips within right upper  quadrant and within left upper quadrant as well.      Water-soluble contrast was ingested by the patient with upper  gastrointestinal examination showing a normal swallowing mechanism.  The distal thoracic esophagus is normal in appearance as is the  esophagogastric junction. There is reduction in volume of the stomach  secondary to vertical sleeve gastrectomy. There is no " opacification  of the excluded portion of the stomach. Contrast flows into the  duodenal. No obstruction or leak of contrast is seen.      IMPRESSION:  Normal postoperative appearance following laparoscopic vertical  sleeve gastrectomy.      Signed by: Kimber Tran 3/21/2024 9:16 AM  Dictation workstation:   [id number]                       Assessment/Plan   Principal Problem:    Obesity, morbid (CMS/HCC)  Active Problems:    Morbid obesity with BMI of 40.0-44.9, adult (CMS/HCC)    Reviewed discharge instructions with patient and .  Handout given.  Discussed lovenox.  F/U 2 weeks.     Victorino Barry MD

## 2024-03-21 NOTE — CONSULTS
ConsultsReason For Consult  HTN, DM, sleep apnea    History Of Present Illness  Nisa Guzman is a 68 y.o. female presenting for bariatric surgery. Denies SOB, nausea, epigastric pain tolerable  Past Medical History  She has a past medical history of Arthritis, Asthma, Hyperlipidemia, Hypertension, and Sleep apnea.    Surgical History  She has a past surgical history that includes Cholecystectomy.     Social History  She reports that she has never smoked. She has never been exposed to tobacco smoke. She has never used smokeless tobacco. She reports that she does not drink alcohol and does not use drugs.    Family History  Family History   Problem Relation Name Age of Onset    Obesity Mother          morbid    Diabetes Mother      Hypertension Mother      Lung cancer Mother      Hypertension Father      Melanoma Father      Stroke Father      Heart attack Father          Allergies  Cortisone, Naproxen sodium, Penicillin, and Adhesive    Review of Systems    Review of Systems   Constitutional:  Negative for chills and fever.        Excessive daytime somnolence   HENT:  Negative for dental problem.    Respiratory:  Negative for cough and shortness of breath.    Gastrointestinal:  Negative for abdominal pain, constipation, diarrhea and nausea.   Genitourinary:  Negative for difficulty urinating.   Musculoskeletal:  Positive for arthralgias and back pain.   Neurological:  Negative for weakness and headaches.         Physical Exam  Physical Exam  Constitutional:       General: She is not in acute distress.     Appearance: She is obese.   HENT:      Mouth/Throat:      Comments: Dentition WNL  Cardiovascular:      Rate and Rhythm: Normal rate and regular rhythm.      Heart sounds: Normal heart sounds.   Pulmonary:      Breath sounds: Normal breath sounds.   Abdominal:      General: Bowel sounds are normal.      Palpations: Abdomen is soft.      Tenderness: There is no abdominal tenderness.   Musculoskeletal:          General: Normal range of motion.      Cervical back: Neck supple. No tenderness.      Right lower leg: No edema.      Left lower leg: No edema.   Lymphadenopathy:      Cervical: No cervical adenopathy.   Skin:     General: Skin is warm.      Findings: No erythema.   Neurological:      General: No focal deficit present.      Mental Status: She is alert and oriented to person, place, and time.      Gait: Gait is intact.   Psychiatric:         Mood and Affect: Mood normal.         Behavior: Behavior normal.              Assessment and plan  HTN- will monitor BP  DM- will monitor blood glucose  Sleep apnea- will use APAP

## 2024-03-21 NOTE — PROGRESS NOTES
03/21/24 1005   Current Planned Discharge Disposition   Current Planned Discharge Disposition Home

## 2024-03-21 NOTE — CARE PLAN
The patient's goals for the shift include Pain control, tolerate liquids, and walk    The clinical goals for the shift include pain control

## 2024-03-21 NOTE — CARE PLAN
Pt does not have a POA or Living Will  ADOD: today    Pt lives at home with her  in a 1 story home with no basement and 2 steps to climb to enter the home  Pt is independent with ADL's, able to drive, shop, clean  She has CPAP; she said that she removes it in her sleep  She  is pre-diabetic, she is being tx for depression and she denies S. I  She does not wear glasses or hearing aids  Her PCP is Dr. Melanie Spain from [facility]; she uses [facility] in [place] for her meds and she can afford them.  She denies drinking ETOH or smoking, no vaping  Pt is here for bariatric surgery; plan is home with     DISCHARGE PLAN: HOME WITH
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
## 2024-03-21 NOTE — PROGRESS NOTES
"Nisa Guzman is a 68 y.o. female on day 1 of admission presenting with Obesity, morbid (CMS/HCC).    Subjective   Alert, denies SOB, abdominal pain or nausea, she ambulates, uses IS       Objective     Physical Exam  Cardiovascular:      Rate and Rhythm: Normal rate and regular rhythm.   Pulmonary:      Breath sounds: Normal breath sounds. No wheezing.   Abdominal:      General: Bowel sounds are normal.   Musculoskeletal:         General: No swelling.         Last Recorded Vitals  Blood pressure (!) 123/46, pulse 73, temperature 36.2 °C (97.2 °F), temperature source Oral, resp. rate 14, height 1.48 m (4' 10.27\"), weight 144 kg (318 lb 5.5 oz), SpO2 99 %.  Intake/Output last 3 Shifts:  I/O last 3 completed shifts:  In: 3843.7 (26.6 mL/kg) [I.V.:3543.7 (24.5 mL/kg); IV Piggyback:300]  Out: 575 (4 mL/kg) [Urine:575 (0.1 mL/kg/hr)]  Weight: 144.4 kg     Relevant Results                             Assessment/Plan   Principal Problem:    Obesity, morbid (CMS/HCC)  Active Problems:    Morbid obesity with BMI of 40.0-44.9, adult (CMS/HCC)  DM- will monitor blood glucose  HTN- will monitor BP           I spent 15 minutes in the professional and overall care of this patient.      Haven Alejandro MD      "

## 2024-03-21 NOTE — CARE PLAN
The patient's goals for the shift include Pain control    The clinical goals for the shift include pain control.

## 2024-03-22 ENCOUNTER — TELEPHONE (OUTPATIENT)
Dept: SURGERY | Facility: CLINIC | Age: 69
End: 2024-03-22
Payer: COMMERCIAL

## 2024-03-22 NOTE — TELEPHONE ENCOUNTER
Bariatric Surg Post-op Call:          Date: 3/22/2024         Called by: Keiry Herndon RD, LDN         How many ounces of fluid are you drinking? 70 oz today so far          How many grams of protein are you drinking? None yet          Any intolerances? No          Any vomiting? No          Still taking pain medication? Yes         Increase in incision pain or drainage? No          Are you taking something to prevent blood clots? Yes, Lovenox          If yes, do you have a plan for INR to be checked? N/A         Are you walking every hour? Yes         Is your first f/u appointment scheduled and when? Yes, on 4/1.       Keiry Herndon RD, LDN  Registered Dietitian, Licensed Dietitian Nutritionist

## 2024-03-27 NOTE — PROGRESS NOTES
2 WK POST OP SLEEVE  START WT: 320   IDEAL WT: 125    START EXCESS: 195    TOTAL WT LOSS:    40 EWL:21  % HT: 58.25 IN.  HPI:     General Comments:          Do you have any difficulties with:  swallowing?  No, nausea?  No, vomiting?  No, pain?  No, heartburn?  No, discomfort?  No, intolerances?  YES, MEDS  Fluid and Protein Intake:    Fluid intake (ounces):     50  Sources:,   Protein intake (grams):    50   Sources:.   Chewable MVI:  Taking as directed?  Yes.   PPI/omeprazole:  Taking as directed/prescribed?  Yes.   Walking:  Every hour?  Yes.   Gallbladder:  Removed?  YES   Primary Care Physician:  Have you seen?  No, Have appointment scheduled?  INSTRUCTED TO SCHEDULE PATIENT STATES UNDERSTANDING AND AGREES     -Bariatric Follow-up:          Receive IV fluids  No. Seen in ER with admission No. Seen ER without admission No. Hospital readmission No.          Medical History:          Objective:        Physical Examination:       Incisions closed. No erythema. No drainage.         Assessment:        Assessment:    1. Surgery follow-up examination - Z09 (Primary)      Plan:        1. Others    Notes:  Advance diet per protocol  Continue PPI until 3 months  Continue MVI with Fe  May increase walking as tolerated, can start cardio if able to compensate increased fluid loss.              Preventive Medicine:      Counseling:  Medication education:  Education:  All new and/or current medications discussed and reviewed including side effects with patient/caregiver, Understanding:  Caregiver/Patient expressed understanding., Adherence:  Barriers to adherence identified and discussed if present. BMI Care goal follow up plan:  Above normal BMI management provided  Dietary management education, guidance, and counseling.           Follow Up: 2 Weeks                   Billing Information:         Visit Code:        Procedure Codes:

## 2024-04-01 ENCOUNTER — LAB (OUTPATIENT)
Dept: LAB | Facility: LAB | Age: 69
End: 2024-04-01
Payer: COMMERCIAL

## 2024-04-01 ENCOUNTER — NUTRITION (OUTPATIENT)
Dept: SURGERY | Facility: CLINIC | Age: 69
End: 2024-04-01

## 2024-04-01 ENCOUNTER — OFFICE VISIT (OUTPATIENT)
Dept: SURGERY | Facility: CLINIC | Age: 69
End: 2024-04-01
Payer: COMMERCIAL

## 2024-04-01 ENCOUNTER — APPOINTMENT (OUTPATIENT)
Dept: RADIOLOGY | Facility: HOSPITAL | Age: 69
End: 2024-04-01
Payer: COMMERCIAL

## 2024-04-01 ENCOUNTER — HOSPITAL ENCOUNTER (OUTPATIENT)
Dept: RADIOLOGY | Facility: HOSPITAL | Age: 69
Discharge: HOME | End: 2024-04-01
Payer: COMMERCIAL

## 2024-04-01 VITALS
WEIGHT: 280 LBS | BODY MASS INDEX: 58.78 KG/M2 | HEIGHT: 58 IN | SYSTOLIC BLOOD PRESSURE: 160 MMHG | HEART RATE: 99 BPM | DIASTOLIC BLOOD PRESSURE: 78 MMHG

## 2024-04-01 DIAGNOSIS — Z09 SURGERY FOLLOW-UP EXAMINATION: Primary | ICD-10-CM

## 2024-04-01 DIAGNOSIS — R06.02 SOB (SHORTNESS OF BREATH): ICD-10-CM

## 2024-04-01 DIAGNOSIS — L30.9 DERMATITIS: ICD-10-CM

## 2024-04-01 LAB
ALBUMIN SERPL-MCNC: 3.9 G/DL (ref 3.5–5)
ALP BLD-CCNC: 117 U/L (ref 35–125)
ALT SERPL-CCNC: 22 U/L (ref 5–40)
ANION GAP SERPL CALC-SCNC: >19 MMOL/L
AST SERPL-CCNC: 22 U/L (ref 5–40)
BASOPHILS # BLD AUTO: 0.12 X10*3/UL (ref 0–0.1)
BASOPHILS NFR BLD AUTO: 0.9 %
BILIRUB SERPL-MCNC: 0.5 MG/DL (ref 0.1–1.2)
BUN SERPL-MCNC: 14 MG/DL (ref 8–25)
CALCIUM SERPL-MCNC: 9.7 MG/DL (ref 8.5–10.4)
CHLORIDE SERPL-SCNC: 96 MMOL/L (ref 97–107)
CO2 SERPL-SCNC: 23 MMOL/L (ref 24–31)
CREAT SERPL-MCNC: 0.7 MG/DL (ref 0.4–1.6)
EGFRCR SERPLBLD CKD-EPI 2021: >90 ML/MIN/1.73M*2
EOSINOPHIL # BLD AUTO: 0.47 X10*3/UL (ref 0–0.7)
EOSINOPHIL NFR BLD AUTO: 3.7 %
ERYTHROCYTE [DISTWIDTH] IN BLOOD BY AUTOMATED COUNT: 14.4 % (ref 11.5–14.5)
GLUCOSE SERPL-MCNC: 111 MG/DL (ref 65–99)
HCT VFR BLD AUTO: 43.1 % (ref 36–46)
HGB BLD-MCNC: 14.7 G/DL (ref 12–16)
IMM GRANULOCYTES # BLD AUTO: 0.06 X10*3/UL (ref 0–0.7)
IMM GRANULOCYTES NFR BLD AUTO: 0.5 % (ref 0–0.9)
LYMPHOCYTES # BLD AUTO: 2.64 X10*3/UL (ref 1.2–4.8)
LYMPHOCYTES NFR BLD AUTO: 20.8 %
MCH RBC QN AUTO: 29.9 PG (ref 26–34)
MCHC RBC AUTO-ENTMCNC: 34.1 G/DL (ref 32–36)
MCV RBC AUTO: 88 FL (ref 80–100)
MONOCYTES # BLD AUTO: 1.09 X10*3/UL (ref 0.1–1)
MONOCYTES NFR BLD AUTO: 8.6 %
NEUTROPHILS # BLD AUTO: 8.33 X10*3/UL (ref 1.2–7.7)
NEUTROPHILS NFR BLD AUTO: 65.5 %
NRBC BLD-RTO: 0 /100 WBCS (ref 0–0)
PLATELET # BLD AUTO: 326 X10*3/UL (ref 150–450)
POTASSIUM SERPL-SCNC: 3.7 MMOL/L (ref 3.4–5.1)
PROT SERPL-MCNC: 7.1 G/DL (ref 5.9–7.9)
RBC # BLD AUTO: 4.92 X10*6/UL (ref 4–5.2)
SODIUM SERPL-SCNC: 139 MMOL/L (ref 133–145)
WBC # BLD AUTO: 12.7 X10*3/UL (ref 4.4–11.3)

## 2024-04-01 PROCEDURE — 3077F SYST BP >= 140 MM HG: CPT

## 2024-04-01 PROCEDURE — 3008F BODY MASS INDEX DOCD: CPT

## 2024-04-01 PROCEDURE — 1159F MED LIST DOCD IN RCRD: CPT

## 2024-04-01 PROCEDURE — 1036F TOBACCO NON-USER: CPT

## 2024-04-01 PROCEDURE — 80053 COMPREHEN METABOLIC PANEL: CPT

## 2024-04-01 PROCEDURE — 1111F DSCHRG MED/CURRENT MED MERGE: CPT

## 2024-04-01 PROCEDURE — 2550000001 HC RX 255 CONTRASTS

## 2024-04-01 PROCEDURE — 71275 CT ANGIOGRAPHY CHEST: CPT

## 2024-04-01 PROCEDURE — 1126F AMNT PAIN NOTED NONE PRSNT: CPT

## 2024-04-01 PROCEDURE — 36415 COLL VENOUS BLD VENIPUNCTURE: CPT

## 2024-04-01 PROCEDURE — 71275 CT ANGIOGRAPHY CHEST: CPT | Performed by: RADIOLOGY

## 2024-04-01 PROCEDURE — 99024 POSTOP FOLLOW-UP VISIT: CPT

## 2024-04-01 PROCEDURE — 1160F RVW MEDS BY RX/DR IN RCRD: CPT

## 2024-04-01 PROCEDURE — 3078F DIAST BP <80 MM HG: CPT

## 2024-04-01 PROCEDURE — 85025 COMPLETE CBC W/AUTO DIFF WBC: CPT

## 2024-04-01 RX ORDER — BISMUTH SUBSALICYLATE 262 MG
1 TABLET,CHEWABLE ORAL DAILY
COMMUNITY

## 2024-04-01 RX ORDER — HYDROCORTISONE 25 MG/G
CREAM TOPICAL 2 TIMES DAILY
Qty: 28 G | Refills: 0 | Status: SHIPPED | OUTPATIENT
Start: 2024-04-01

## 2024-04-01 RX ADMIN — IOHEXOL 75 ML: 350 INJECTION, SOLUTION INTRAVENOUS at 16:29

## 2024-04-01 ASSESSMENT — PAIN SCALES - GENERAL: PAINLEVEL: 0-NO PAIN

## 2024-04-01 NOTE — PROGRESS NOTES
Subjective   Patient ID: Nisa Guzman is a 68 y.o. female who presents for Post-op (2 WK POST OP SLEEVE).  HPI  Nisa is here for her 2 week follow up. She tells me she is having trouble with her pills. She is also having trouble sleeping. She tells me this is a chronic issue. I recommended that she try OTC liquid melatonin. IF this does not work, I recommended that she follow up with her PCP. Nisa also tells me that she is having a sharp pain in her R rib when she takes a deep breath. It started Thursday. It does not happen when she breathes normally. She tells me she has been using her IS. She denies reflux or nausea. She is drinking 50oz of fluid per day. She is having itching around her incisions. She is getting 80-90g of protein per day. She is taking omeprazole, the flintstones MVI, and lovenox. She has 2 more weeks of lovenox. She does not have a gallbladder.    Objective   Physical Exam  Constitutional:       General: She is not in acute distress.     Appearance: Normal appearance. She is obese.   HENT:      Head: Normocephalic.   Eyes:      Pupils: Pupils are equal, round, and reactive to light.   Cardiovascular:      Rate and Rhythm: Normal rate and regular rhythm.   Pulmonary:      Effort: Pulmonary effort is normal.   Abdominal:      General: There is no distension.      Palpations: Abdomen is soft.      Tenderness: There is no abdominal tenderness.      Comments: Erythema around incisions. No drainage.   Musculoskeletal:         General: Normal range of motion.   Skin:     General: Skin is warm and dry.      Findings: No erythema.   Neurological:      Mental Status: She is alert and oriented to person, place, and time.   Psychiatric:         Mood and Affect: Mood normal.         Assessment/Plan   Problem List Items Addressed This Visit             ICD-10-CM    Dermatitis L30.9    Relevant Medications    hydrocortisone 2.5 % cream    SOB (shortness of breath) R06.02    Relevant Orders    CBC  "and Auto Differential (Completed)    Comprehensive Metabolic Panel    CT angio chest for pulmonary embolism    Surgery follow-up examination - Primary Z09   Continue activity restrictions. Continue to crush pills. May face shower. Advance diet per protocol. Continue MVI with Fe, PPI. Reviewed the \"rules\" for long term weight loss success. Continue to increase walking for exercise.    Nisa will use hydrocortisone cream on and around her incisions to help with her redness and itching. She will call the office if it becomes worse.     Nisa was instructed to go to [facility] to get labwork after her appointment. Her CT angio to rule out PE was scheduled for 330 PM.    Nisa's scan was negative for PE. She was called and given the results. She was instructed to continue to use her IS and walk every hour.     SELENA Rodgers-CNP 04/01/24 11:20 AM   "

## 2024-04-01 NOTE — PROGRESS NOTES
2 week Post-op Appointment - Dietary Follow Up: sleeve     Current Weight: 280 lb  Current BMI: 58.02    Reviewed pureed diet progression. Patient was instructed to start purees on Thursday, April 4. I reviewed how to blend foods appropriately to one consistency using a . Pureed food examples and recipes were provided in packet, reviewed foods to avoid at this time. Informed patient to measure out portion sizes and track volume size. Informed patient to aim for at least 60g of protein per day and to add protein shake as needed. Encouraged continuing to prioritize fluids and ensure getting in at least 50-60 oz fluid daily. Reviewed the importance of slowly eating and stop when starting to feel full. Patient was instructed to follow pureed diet for a full 2 weeks, and to not progress diet until instructed to do so. Pureed education packet was given, patient shows good understanding.         Keiry Herndon RD, LDN  Registered Dietitian, Licensed Dietitian Nutritionist

## 2024-04-12 NOTE — PROGRESS NOTES
"4 WK POST OP SLEEVE  START WT: 320   IDEAL WT:  125   START EXCESS: 195 TOTAL WT LOSS: 50   EWL: 26 % HT: 58.25    IN.  ADMITS TO HAVING DIARRHEA    HPI:     General Comments:          Do you have any difficulties with:  swallowing?  No, nausea?  YES   vomiting?  YES, pain?  No, heartburn?  YES   discomfort?  No, intolerances?  No.   Fluid and Protein Intake:  Reviewed fluid and protein log:  Yes,   Fluid intake (ounces):     CLOSE TO 50 Sources:,   Protein intake (grams): ABOUT 4O GRAMS     Sources:.   Daily Diet Recall: ----. Volume of food at a time: pureed.   Chewable MVI:  Taking as directed?  Yes.   PPI/omeprazole:  Taking as directed/prescribed?  Yes.   Walking:  Every hour?  Yes.      -Bariatric Follow-up:          Receive IV fluids  No. Seen in ER with admission No. Seen ER without admission No. Hospital readmission No.          Medical History:          Objective:        Physical Examination:       Incisions healed.         Assessment:        Assessment:    1. Surgery follow-up examination - Z09      Plan:        1. Others    Notes: Advance diet per protocol  Continue MVI with Fe, PPI  Reviewed the \"rules\" for long term weight loss success.  Continue to increase walking for exercise..              Preventive Medicine:      Counseling:  Medication education:  Education:  All new and/or current medications discussed and reviewed including side effects with patient/caregiver, Understanding:  Caregiver/Patient expressed understanding., Adherence:  Barriers to adherence identified and discussed if present. BMI Care goal follow up plan:  Above normal BMI management provided  Dietary management education, guidance, and counseling.           Follow Up: 2 Weeks                   Billing Information:         Visit Code:        Procedure Codes:       "

## 2024-04-15 ENCOUNTER — OFFICE VISIT (OUTPATIENT)
Dept: SURGERY | Facility: CLINIC | Age: 69
End: 2024-04-15
Payer: COMMERCIAL

## 2024-04-15 VITALS — HEIGHT: 58 IN | WEIGHT: 270 LBS | BODY MASS INDEX: 56.68 KG/M2

## 2024-04-15 DIAGNOSIS — E86.0 DEHYDRATION: ICD-10-CM

## 2024-04-15 DIAGNOSIS — Z09 SURGERY FOLLOW-UP EXAMINATION: Primary | ICD-10-CM

## 2024-04-15 PROCEDURE — 1111F DSCHRG MED/CURRENT MED MERGE: CPT

## 2024-04-15 PROCEDURE — 99024 POSTOP FOLLOW-UP VISIT: CPT

## 2024-04-15 PROCEDURE — 1160F RVW MEDS BY RX/DR IN RCRD: CPT

## 2024-04-15 PROCEDURE — 1036F TOBACCO NON-USER: CPT

## 2024-04-15 PROCEDURE — 3008F BODY MASS INDEX DOCD: CPT

## 2024-04-15 PROCEDURE — 1159F MED LIST DOCD IN RCRD: CPT

## 2024-04-15 RX ORDER — ALBUTEROL SULFATE 0.83 MG/ML
3 SOLUTION RESPIRATORY (INHALATION) AS NEEDED
OUTPATIENT
Start: 2024-04-15

## 2024-04-15 RX ORDER — EPINEPHRINE 0.3 MG/.3ML
0.3 INJECTION SUBCUTANEOUS EVERY 5 MIN PRN
OUTPATIENT
Start: 2024-04-15

## 2024-04-15 RX ORDER — ONDANSETRON HYDROCHLORIDE 2 MG/ML
4 INJECTION, SOLUTION INTRAVENOUS ONCE
OUTPATIENT
Start: 2024-04-15 | End: 2024-04-15

## 2024-04-15 RX ORDER — FAMOTIDINE 10 MG/ML
20 INJECTION INTRAVENOUS ONCE AS NEEDED
OUTPATIENT
Start: 2024-04-15

## 2024-04-15 RX ORDER — DIPHENHYDRAMINE HYDROCHLORIDE 50 MG/ML
50 INJECTION INTRAMUSCULAR; INTRAVENOUS AS NEEDED
OUTPATIENT
Start: 2024-04-15

## 2024-04-15 RX ORDER — SODIUM CHLORIDE, SODIUM LACTATE, POTASSIUM CHLORIDE, CALCIUM CHLORIDE 600; 310; 30; 20 MG/100ML; MG/100ML; MG/100ML; MG/100ML
2000 INJECTION, SOLUTION INTRAVENOUS ONCE
OUTPATIENT
Start: 2024-04-15

## 2024-04-15 NOTE — PROGRESS NOTES
Subjective   Patient ID: Nisa Guzman is a 68 y.o. female who presents for No chief complaint on file..  HPI  Nisa is here for her 4 week follow up. She denies reflux. She is having occasional nausea. She tells me that the pureed diet is not going well. She tells me that last night she ate purred shrimp. She drank a sip of water 30 min after her meal. She had a low calorie snapple ice tea 60 min after that sip. She tells me a little after that, she started to dry heave, then had liquid stools. This lasted throughout the night, and through the morning. She is getting close to 50oz of fluid per day. She thinks she is getting about 40g of protein per day. She is taking omeprazole, celebrate 18, and lovenox. She has a few more days of lovenox.     Objective   Physical Exam  Constitutional:       General: She is not in acute distress.     Appearance: Normal appearance. She is obese.   HENT:      Head: Normocephalic.   Eyes:      Pupils: Pupils are equal, round, and reactive to light.   Cardiovascular:      Rate and Rhythm: Normal rate and regular rhythm.   Pulmonary:      Effort: Pulmonary effort is normal.   Abdominal:      General: There is no distension.      Palpations: Abdomen is soft.      Comments: Appropriately TTP around incisions, incisions CDI and closed with glue   Musculoskeletal:         General: Normal range of motion.   Skin:     General: Skin is warm and dry.   Neurological:      Mental Status: She is alert and oriented to person, place, and time.   Psychiatric:         Mood and Affect: Mood normal.         Assessment/Plan   Problem List Items Addressed This Visit             ICD-10-CM    Surgery follow-up examination - Primary Z09    Dehydration E86.0     I instructed Nisa to stay on a clear liquid diet the rest of the day. If she felt better in the morning she may try to resume a small amount of purees. We discussed setting up an appointment with the infusion center for 2L of LR with the  "amount of liquid stool she is having to ensure she stay hydrated. Someone from the office will call her on Wednesday to see how she feels and see if she is appropriate to advance her diet.     Continue activity restrictions. Continue to crush pills. Continue MVI with GARY Agudelo. Reviewed the \"rules\" for long term weight loss success. Continue to increase walking for exercise.       aFng Magana, SELENA-CNP 04/15/24 9:08 AM   "

## 2024-04-19 ENCOUNTER — TELEPHONE (OUTPATIENT)
Dept: SURGERY | Facility: CLINIC | Age: 69
End: 2024-04-19
Payer: COMMERCIAL

## 2024-04-19 NOTE — TELEPHONE ENCOUNTER
4 week Post-op Appointment - Dietary Follow Up     Current Weight: 270 lb (wt taken in the office on 4/15)   Current BMI: 55.95     Nisa was seen in the office on 4/15 for her 4 week post op follow up appt. At that time, Nisa was having bad diarrhea, possibly due to food poisoning. Nisa was provided the 4 week education materials and I called her today to review it. Nisa reports that she is staying hydrated- she reports consuming at least 70 oz of fluids daily from water, Gatorade Zero, fat-free milk, and unsweetened tea. Nisa began the soft food diet yesterday and reports tolerating all foods and beverages, including scrambled eggs, cottage cheese, and crock pot chicken.     Reviewed soft food diet progression. Patient was instructed to start soft food stage 1 on Thursday, 4/18. I reviewed which foods patient can start to incorporate and which foods to avoid. Instructed to start soft food stage 2 on Thursday, 4/25. Informed patient to measure out portion sizes and track volume size. Informed patient to aim for at least 60g of protein per day and to add protein shake as needed. Encouraged continuing to prioritize fluids and ensure getting in at least 50-60 oz fluid daily. Reviewed the importance of slowly eating and stop when starting to feel full. Patient was instructed to follow the soft diet for a full 2 weeks, and to not progress diet until instructed to do so. Soft food education packet was given, patient shows good understanding.           Keiry Herndon RD, LDN  Registered Dietitian, Licensed Dietitian Nutritionist

## 2024-04-22 NOTE — PROGRESS NOTES
"6 WK POST OP SLEEVE, LYSIS OF ADHESIONS  WILL NEED INCISIONAL HERNIA REPAIR AFTER WT LOSS  START WT:   320 IDEAL WT: 125    START EXCESS:  195   TOTAL WT LOSS:  51  EWL:  26    % HT:  58.25   IN.  Chief Complaints:         1. PBS:6 wk f/u.          HPI:     General Comments:          Do you have any difficulties with:    swallowing?  No, nausea?  yes, vomiting?  No, pain? yes, heartburn?  No, discomfort?  No,   intolerances?  yes.   Fluid and Protein Intake:  Reviewed fluid and protein log:  Yes,   Fluid intake (ounces):  60 Sources:,   Protein intake (grams): 50  Sources:.   Daily Diet Recall: ----. Volume of food at a time: soft.   Chewable MVI:    Taking as directed?  Yes.   PPI/omeprazole:  Taking as directed/prescribed?  Yes.   Walking:  Every hour?  Yes.        -Bariatric Follow-up:          Receive IV fluids  No. Seen in ER with admission No. Seen ER without admission No. Hospital readmission No.           Medical History:          Objective:       Assessment:        Assessment:    1. Surgery follow-up examination - Z09   2. H/O bariatric surgery - Z98.84   3. 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 Vitamin D deficiency, unspecified - E55.9   5. B12 deficiency - E53.8   6. Hyperparathyroidism - E21.3      Plan:        1. Others    Notes:  Advance diet per protocol  May swallow whole pills  Reviewed the \"rules\" for long-term weight loss success  Continue PPI, MVI with Fe  Begin micronutrient supplement protocol/handout given  Activity restrictions lifted  Discussed the importance of regular exercise for continued long-term weight loss success  .              Preventive Medicine:      Counseling:  Medication education:  Education:  All new and/or current medications discussed and reviewed including side effects with patient/caregiver, Understanding:  Caregiver/Patient expressed understanding., Adherence:  Barriers to adherence identified and discussed if present. BMI Care goal follow up plan:  Above normal " BMI management provided  Dietary management education, guidance, and counseling.           Follow Up: 6 Weeks

## 2024-04-29 ENCOUNTER — NUTRITION (OUTPATIENT)
Dept: SURGERY | Facility: CLINIC | Age: 69
End: 2024-04-29

## 2024-04-29 ENCOUNTER — OFFICE VISIT (OUTPATIENT)
Dept: SURGERY | Facility: CLINIC | Age: 69
End: 2024-04-29
Payer: COMMERCIAL

## 2024-04-29 VITALS
HEIGHT: 58 IN | WEIGHT: 269 LBS | SYSTOLIC BLOOD PRESSURE: 157 MMHG | HEART RATE: 91 BPM | BODY MASS INDEX: 56.47 KG/M2 | DIASTOLIC BLOOD PRESSURE: 88 MMHG

## 2024-04-29 DIAGNOSIS — Z98.84 H/O BARIATRIC SURGERY: ICD-10-CM

## 2024-04-29 DIAGNOSIS — E21.3 HYPERPARATHYROIDISM (MULTI): ICD-10-CM

## 2024-04-29 DIAGNOSIS — E55.9 VITAMIN D DEFICIENCY: ICD-10-CM

## 2024-04-29 DIAGNOSIS — E53.8 VITAMIN B12 DEFICIENCY: ICD-10-CM

## 2024-04-29 DIAGNOSIS — Z09 SURGERY FOLLOW-UP EXAMINATION: Primary | ICD-10-CM

## 2024-04-29 DIAGNOSIS — K90.9 INTESTINAL MALABSORPTION, UNSPECIFIED TYPE (HHS-HCC): ICD-10-CM

## 2024-04-29 PROCEDURE — 99024 POSTOP FOLLOW-UP VISIT: CPT

## 2024-04-29 PROCEDURE — 1126F AMNT PAIN NOTED NONE PRSNT: CPT

## 2024-04-29 PROCEDURE — 3077F SYST BP >= 140 MM HG: CPT

## 2024-04-29 PROCEDURE — 3008F BODY MASS INDEX DOCD: CPT

## 2024-04-29 PROCEDURE — 3079F DIAST BP 80-89 MM HG: CPT

## 2024-04-29 PROCEDURE — 1036F TOBACCO NON-USER: CPT

## 2024-04-29 PROCEDURE — 1159F MED LIST DOCD IN RCRD: CPT

## 2024-04-29 PROCEDURE — 1160F RVW MEDS BY RX/DR IN RCRD: CPT

## 2024-04-29 ASSESSMENT — PAIN SCALES - GENERAL: PAINLEVEL: 0-NO PAIN

## 2024-04-29 ASSESSMENT — PATIENT HEALTH QUESTIONNAIRE - PHQ9
SUM OF ALL RESPONSES TO PHQ9 QUESTIONS 1 AND 2: 0
2. FEELING DOWN, DEPRESSED OR HOPELESS: NOT AT ALL
1. LITTLE INTEREST OR PLEASURE IN DOING THINGS: NOT AT ALL

## 2024-04-29 ASSESSMENT — COLUMBIA-SUICIDE SEVERITY RATING SCALE - C-SSRS
1. IN THE PAST MONTH, HAVE YOU WISHED YOU WERE DEAD OR WISHED YOU COULD GO TO SLEEP AND NOT WAKE UP?: NO
2. HAVE YOU ACTUALLY HAD ANY THOUGHTS OF KILLING YOURSELF?: NO
6. HAVE YOU EVER DONE ANYTHING, STARTED TO DO ANYTHING, OR PREPARED TO DO ANYTHING TO END YOUR LIFE?: NO

## 2024-04-29 NOTE — PROGRESS NOTES
6 week Post-op Appointment - Dietary Follow Up    Starting Weight: 320 lbs   Current Weight: 269 lbs   Current BMI: 55.74     Reviewed 6 week diet progression. I reviewed which foods patient can start to incorporate and which foods to avoid. I reviewed timeline with patient over the next 6 weeks on which foods can be reintroduced back into the diet. Informed patient to measure out portion sizes and track volume size. Informed patient to aim for at least 60g of protein per day and continue to prioritize fluids and ensure getting in at least 50-60 oz fluid daily. Reviewed the importance of slowly eating and stop when starting to feel full. 6 week diet education packet was given. Reviewed Vitamin and Mineral supplementation to start taking at 6 weeks post-op in addition to daily MVI. Patient shows good understanding.     Orquidea Santamaria, MS, RD, LD

## 2024-04-29 NOTE — PROGRESS NOTES
Subjective   Patient ID: iNsa Guzman is a 68 y.o. female who presents for Post-op (6 wk post op sleeve, lysis of adhesions).  ABDULAZIZ Paula is here for her 6 week follow up. She denies reflux. She is drinking over 60oz of fluid per day. She thinks she is getting about 60g of protein per day. She is eating 1 egg for breakfast. She is measuring out 1/4c of protein for her meals. She tells me that she feels very hungry. She tells me that she walks all the time. She is taking omeprazole and celebrate 18.    Objective   Physical Exam  Constitutional:       Appearance: Normal appearance.   Eyes:      Pupils: Pupils are equal, round, and reactive to light.   Cardiovascular:      Rate and Rhythm: Normal rate.   Pulmonary:      Effort: Pulmonary effort is normal.   Abdominal:      Palpations: Abdomen is soft.   Musculoskeletal:         General: Normal range of motion.   Skin:     General: Skin is warm and dry.   Neurological:      General: No focal deficit present.      Mental Status: She is alert and oriented to person, place, and time.   Psychiatric:         Mood and Affect: Mood normal.         Assessment/Plan   Problem List Items Addressed This Visit             ICD-10-CM    Surgery follow-up examination - Primary Z09    Intestinal malabsorption ([facility]) K90.9    Relevant Orders    CBC and Auto Differential    Comprehensive Metabolic Panel    Copper, Blood    Ferritin    Folate    Iron and TIBC    Parathyroid Hormone, Intact    Vitamin B1, Whole Blood    Vitamin B12    Vitamin D 25-Hydroxy,Total (for eval of Vitamin D levels)    Zinc, Serum or Plasma    Vitamin D deficiency E55.9    Relevant Orders    Vitamin D 25-Hydroxy,Total (for eval of Vitamin D levels)    Vitamin B12 deficiency E53.8    Relevant Orders    Vitamin B12    Hyperparathyroidism (Multi) E21.3    Relevant Orders    Parathyroid Hormone, Intact    H/O bariatric surgery Z98.84     Start calcium citrate BID,  from multivitamin by at least 2  "hours. Continue lifting restrictions. Other activity restrictions lifted. Advance diet per protocol. Continue MVI with Fe, PPI. Reviewed the \"rules\" for long term weight loss success. Continue to increase walking for exercise.    I asked Alanna to eat until she was full to see if that helps with her between meal hungriness. She will start eating 2 eggs with breakfast.        Fang Magana, SELENA-CNP 04/29/24 3:55 PM   "

## 2024-04-30 PROBLEM — E55.9 VITAMIN D DEFICIENCY: Status: ACTIVE | Noted: 2024-04-30

## 2024-04-30 PROBLEM — E53.8 VITAMIN B12 DEFICIENCY: Status: ACTIVE | Noted: 2024-04-30

## 2024-04-30 PROBLEM — E21.3 HYPERPARATHYROIDISM (MULTI): Status: ACTIVE | Noted: 2024-04-30

## 2024-04-30 PROBLEM — K90.9 INTESTINAL MALABSORPTION (HHS-HCC): Status: ACTIVE | Noted: 2024-04-30

## 2024-04-30 PROBLEM — Z98.84 H/O BARIATRIC SURGERY: Status: ACTIVE | Noted: 2024-04-30

## 2024-05-07 ENCOUNTER — HOSPITAL ENCOUNTER (EMERGENCY)
Facility: HOSPITAL | Age: 69
Discharge: HOME | End: 2024-05-07
Payer: COMMERCIAL

## 2024-05-07 ENCOUNTER — APPOINTMENT (OUTPATIENT)
Dept: RADIOLOGY | Facility: HOSPITAL | Age: 69
End: 2024-05-07
Payer: COMMERCIAL

## 2024-05-07 VITALS
HEIGHT: 58 IN | TEMPERATURE: 97.7 F | OXYGEN SATURATION: 97 % | RESPIRATION RATE: 18 BRPM | WEIGHT: 268 LBS | SYSTOLIC BLOOD PRESSURE: 113 MMHG | DIASTOLIC BLOOD PRESSURE: 71 MMHG | HEART RATE: 88 BPM | BODY MASS INDEX: 56.25 KG/M2

## 2024-05-07 DIAGNOSIS — M79.604 PAIN OF RIGHT LOWER EXTREMITY: Primary | ICD-10-CM

## 2024-05-07 PROCEDURE — 93971 EXTREMITY STUDY: CPT

## 2024-05-07 PROCEDURE — 93971 EXTREMITY STUDY: CPT | Performed by: STUDENT IN AN ORGANIZED HEALTH CARE EDUCATION/TRAINING PROGRAM

## 2024-05-07 PROCEDURE — 99284 EMERGENCY DEPT VISIT MOD MDM: CPT | Mod: 25

## 2024-05-07 RX ORDER — CEPHALEXIN 500 MG/1
500 CAPSULE ORAL 4 TIMES DAILY
Qty: 28 CAPSULE | Refills: 0 | Status: SHIPPED | OUTPATIENT
Start: 2024-05-07 | End: 2024-05-14

## 2024-05-07 ASSESSMENT — LIFESTYLE VARIABLES
HAVE YOU EVER FELT YOU SHOULD CUT DOWN ON YOUR DRINKING: NO
HAVE PEOPLE ANNOYED YOU BY CRITICIZING YOUR DRINKING: NO
TOTAL SCORE: 0
EVER FELT BAD OR GUILTY ABOUT YOUR DRINKING: NO
EVER HAD A DRINK FIRST THING IN THE MORNING TO STEADY YOUR NERVES TO GET RID OF A HANGOVER: NO

## 2024-05-07 ASSESSMENT — PAIN DESCRIPTION - ORIENTATION: ORIENTATION: RIGHT

## 2024-05-07 ASSESSMENT — PAIN DESCRIPTION - PAIN TYPE: TYPE: ACUTE PAIN

## 2024-05-07 ASSESSMENT — PAIN SCALES - GENERAL: PAINLEVEL_OUTOF10: 7

## 2024-05-07 ASSESSMENT — PAIN DESCRIPTION - LOCATION: LOCATION: LEG

## 2024-05-07 ASSESSMENT — PAIN - FUNCTIONAL ASSESSMENT: PAIN_FUNCTIONAL_ASSESSMENT: 0-10

## 2024-05-07 NOTE — ED PROVIDER NOTES
HPI   Chief Complaint   Patient presents with    Leg Swelling     Patient reports right leg swelling for the past few weeks but now has redness.  Patient had bariatric surgery on March 20, 2024 , her doctor is concerned about a possible blood clot vs cellulitis. Patient was on Lovenox BID after surgery until last week.        HPI  See my MDM                  [place] Coma Scale Score: 15                     Patient History   Past Medical History:   Diagnosis Date    Acid reflux     Arthritis     Asthma (HHS-HCC)     DM (diabetes mellitus) (Multi)     Fibromyalgia     Hyperlipidemia     Hypertension     Macular degeneration     Osteoarthritis     Polymyalgia (Multi)     Skin cancer     Sleep apnea     Thyroid nodule      Past Surgical History:   Procedure Laterality Date    CARPAL TUNNEL RELEASE  2019    CATARACT EXTRACTION  2005    CHOLECYSTECTOMY  1987    JOINT REPLACEMENT  2021    RT THUMB    JOINT REPLACEMENT Left 2021    LEFT THUMB    LAPAROSCOPY GASTRECTOMY PARTIAL / TOTAL  03/20/2024    LAPAROSCOPIC VERTICAL SLEEVE GASTRECTOMY, LYSIS OF ADHESIONS ([facility])    OTHER SURGICAL HISTORY      MELANOMA    OTHER SURGICAL HISTORY      MELANOMA    TOTAL KNEE ARTHROPLASTY      TOTAL WRIST ARTHROPLASTY  2021     Family History   Problem Relation Name Age of Onset    Obesity Mother          morbid    Diabetes Mother      Hypertension Mother      Lung cancer Mother      Hypertension Father      Melanoma Father      Stroke Father      Heart attack Father       Social History     Tobacco Use    Smoking status: Never     Passive exposure: Never    Smokeless tobacco: Never   Vaping Use    Vaping status: Never Used   Substance Use Topics    Alcohol use: Never    Drug use: Never       Physical Exam   ED Triage Vitals [05/07/24 1716]   Temperature Heart Rate Respirations BP   36.5 °C (97.7 °F) (!) 106 18 118/73      Pulse Ox Temp Source Heart Rate Source Patient Position   96 % Oral Monitor Sitting      BP Location FiO2 (%)      Left arm --       Physical Exam  CONSTITUTIONAL: Vital signs reviewed as charted, well-developed and in no distress  Eyes: Extraocular muscles are intact. Pupils equal round and reactive to light. Conjunctiva are pink.    ENT: Mucous membranes are moist. Tongue in the midline. Pharynx was without erythema or exudates, uvula midline  LUNGS: Breath sounds equal and clear to auscultation. Good air exchange, no wheezes rales or retractions, pulse oximetry is charted.  HEART: Regular rate and rhythm without murmur thrill or rub, strong tones, auscultation is normal.  ABDOMEN: Soft and nontender without guarding rebound rigidity or mass. Bowel sounds are present and normal in all quadrants. There is no palpable masses or aneurysms identified. No hepatosplenomegaly, normal abdominal exam.  Neuro: The patient is awake, alert and oriented ×3. Moving all 4 extremities and answering questions appropriately.   MUSCULOSKELETAL: There is edema present to the lower leg from the knee down, mild pinkness to the anterior aspect of the distal third of the tib-fib.  Motor sensation pulses are intact distally.  Cap refill less than 3 seconds.  PSYCH: Awake alert oriented, normal mood and affect.  Skin:  Dry, normal color, warm to the touch, no rash present.      ED Course & MDM   Diagnoses as of 05/07/24 1917   Pain of right lower extremity       Medical Decision Making  History obtained from: patient    Vital signs, nursing notes, current medications, past medical history, Surgical history, allergies, social history, family History were reviewed.         HPI:  Patient 68-year-old female present emergency room today 2 months status post bariatric surgery.  States she was on Lovenox abdominal about a week ago.  States she noticed over the last day or 2 she has had some lower extremity edema is becoming painful.  There is some pinkness to the anterior aspect of the distal third.  Denies any dizziness, chest pain, shortness of breath or  "abdominal pain.  Denies fever chills or night sweats.  She is nontoxic well-appearing      10 point ROS was reviewed and negative except Noted above in HPI.  DDX: as listed above          MDM Summary/considerations:  EMERGENCY DEPARTMENT COURSE and DIFFERENTIAL DIAGNOSIS/MDM:        The patient presented with a chief complaint of right lower extremity pain and swelling. The differential diagnosis associated with this patient's presentation includes DVT, cellulitis, sprain or strain, peripheral edema.       Vitals:    Vitals:    05/07/24 1716   BP: 118/73   BP Location: Left arm   Patient Position: Sitting   Pulse: (!) 106   Resp: 18   Temp: 36.5 °C (97.7 °F)   TempSrc: Oral   SpO2: 96%   Weight: 122 kg (268 lb)   Height: 1.473 m (4' 10\")       Diagnoses as of 05/07/24 1917   Pain of right lower extremity       History Limited by:    None    Independent history obtained from:    None      External records reviewed:    None      Diagnostics interpreted by me:    Ultrasound(s) right lower extremity      Discussions with other clinicians:    None      Chronic conditions impacting care:    None      Social determinants of health affecting care:    None    Diagnostic tests considered but not performed: none    ED Medications managed:    Medications - No data to display      Prescription drugs considered:    Antibiotics Keflex    Labs Reviewed - No data to display  Lower extremity venous duplex right   Final Result   No evidence of acute DVT in the right lower extremity.        MACRO:   None.        Signed by: Steffen Duke 5/7/2024 7:11 PM   Dictation workstation:   [id number]        Medications - No data to display  New Prescriptions    CEPHALEXIN (KEFLEX) 500 MG CAPSULE    Take 1 capsule (500 mg) by mouth 4 times a day for 7 days.     I estimate there is LOW risk for COMPARTMENT SYNDROME, NECROTIZING FASCIITIS, TENDON OR NEUROVASCULAR INJURY, or FOREIGN BODY, thus I consider the discharge disposition reasonable. " Also, there is no evidence for peritonitis, sepsis, or toxicity. We have discussed the diagnosis and risks, and we agree with discharging home to follow-up with their primary doctor. We also discussed returning to the Emergency Department immediately if new or worsening symptoms occur. We have discussed the symptoms which are most concerning (e.g., changing or worsening pain, fever, numbness, weakness, cool or painful digits) that necessitate immediate return.       Ultrasound negative for embolism, patient will be started on antibiotics to cover against early cellulitis.  Will follow PCP 1 to 2 days for reevaluation.  Was discharged home in stable condition.    All of the patient's questions were answered to the best of my ability.  Patient states understanding that they have been screened for an emergency today and we have not found any etiology of symptoms that requires emergent treatment or admission to the hospital at this point. They understand that they have not had definitive care day and require follow-up for treatment of their condition. They also state understanding that they may have an emergent condition that may potentially have not of detected at this visit and they must return to the emergency department if they develop any worsening of symptoms or new complaints.      I have evaluated this patient, my supervising physician was available for consultation.            Critical Care: Not warranted at this time        This chart was completed using voice recognition transcription software. Please excuse any errors of transcription including grammatical, punctuation, syntax and spelling errors.  Please contact me with any questions regarding this chart.    Procedure  Procedures     SELENA Sierra-CNP  05/07/24 1917

## 2024-06-20 ENCOUNTER — TELEPHONE (OUTPATIENT)
Dept: SURGERY | Facility: CLINIC | Age: 69
End: 2024-06-20
Payer: COMMERCIAL

## 2024-06-20 NOTE — PROGRESS NOTES
Subjective   Patient ID: Nisa Guzman is a 68 y.o. female who presents for No chief complaint on file..  HPI  3 MOS FUV SLEEVE  START WT: 320  IDEAL WT:125 START EXCESS: 195 HT:   58.25      Labs done in epic/ some are in process  Review of Systems  Bariatric Surgery F/U:          Seen at ER after surgery? No.  Hospital admission? No.  Bariatric reoperation? No.  Bariatric intervention? No.  Was anticoagulation initiated for presumed/confirmed Vein Thrombosis/PE? No.  Was an incisional hernia noted on exam? No.  Sleep Apnea? YES Still using C-PAP? No.  GERD req. meds? YES  Hyperlipidemia? No.  Still taking Cholesterol med? No.  Hypertension? YES.  Still taking HTN med? YES  Number of Anti-hypertensive Medications 2 Diabetes?YES.  Still taking DM med? No.  Current DM medication type: _____.         CONSTITUTIONAL:          Chills No.  Fatigue No.  Fever No.         CARDIOLOGY:          Negative for dizziness, chest pain, palpitations, shortness of breath.         RESPIRATORY:          Negative for chest congestion, cough, wheezing.         GASTROENTEROLOGY:          Food Intolerance YES  Acid reflux YES  Abdominal pain No.  Black stools No.  Constipation  YES  Diarrhea  YES  Loss of appetite no.  Nausea   YES Vomiting  YES.         UROLOGY:          Negative for dysuria, urinary urgency, kidney stones.         PSYCHOLOGY:          Negative for depression, anxiety, high stress level.   Objective   Physical Exam    Assessment/Plan            Saira Gutiérrez LPN 06/20/24 4:32 PM

## 2024-06-26 ENCOUNTER — TELEPHONE (OUTPATIENT)
Dept: SURGERY | Facility: CLINIC | Age: 69
End: 2024-06-26
Payer: COMMERCIAL

## 2024-06-26 ENCOUNTER — LAB (OUTPATIENT)
Dept: LAB | Facility: LAB | Age: 69
End: 2024-06-26
Payer: COMMERCIAL

## 2024-06-26 DIAGNOSIS — E21.3 HYPERPARATHYROIDISM (MULTI): ICD-10-CM

## 2024-06-26 DIAGNOSIS — E53.8 VITAMIN B12 DEFICIENCY: ICD-10-CM

## 2024-06-26 DIAGNOSIS — E55.9 VITAMIN D DEFICIENCY: ICD-10-CM

## 2024-06-26 DIAGNOSIS — K90.9 INTESTINAL MALABSORPTION, UNSPECIFIED TYPE (HHS-HCC): ICD-10-CM

## 2024-06-26 LAB
25(OH)D3 SERPL-MCNC: 35 NG/ML (ref 31–100)
ALBUMIN SERPL-MCNC: 3.9 G/DL (ref 3.5–5)
ALP BLD-CCNC: 165 U/L (ref 35–125)
ALT SERPL-CCNC: 31 U/L (ref 5–40)
ANION GAP SERPL CALC-SCNC: 14 MMOL/L
AST SERPL-CCNC: 36 U/L (ref 5–40)
BASOPHILS # BLD AUTO: 0.06 X10*3/UL (ref 0–0.1)
BASOPHILS NFR BLD AUTO: 0.7 %
BILIRUB SERPL-MCNC: 0.4 MG/DL (ref 0.1–1.2)
BUN SERPL-MCNC: 15 MG/DL (ref 8–25)
CALCIUM SERPL-MCNC: 9.1 MG/DL (ref 8.5–10.4)
CHLORIDE SERPL-SCNC: 101 MMOL/L (ref 97–107)
CO2 SERPL-SCNC: 24 MMOL/L (ref 24–31)
CREAT SERPL-MCNC: 0.6 MG/DL (ref 0.4–1.6)
EGFRCR SERPLBLD CKD-EPI 2021: >90 ML/MIN/1.73M*2
EOSINOPHIL # BLD AUTO: 0.17 X10*3/UL (ref 0–0.7)
EOSINOPHIL NFR BLD AUTO: 2 %
ERYTHROCYTE [DISTWIDTH] IN BLOOD BY AUTOMATED COUNT: 13.3 % (ref 11.5–14.5)
FERRITIN SERPL-MCNC: 123 NG/ML (ref 13–150)
FOLATE SERPL-MCNC: 7.8 NG/ML (ref 4.2–19.9)
GLUCOSE SERPL-MCNC: 89 MG/DL (ref 65–99)
HCT VFR BLD AUTO: 44.2 % (ref 36–46)
HGB BLD-MCNC: 14.4 G/DL (ref 12–16)
IMM GRANULOCYTES # BLD AUTO: 0.02 X10*3/UL (ref 0–0.7)
IMM GRANULOCYTES NFR BLD AUTO: 0.2 % (ref 0–0.9)
IRON SATN MFR SERPL: 21 % (ref 12–50)
IRON SERPL-MCNC: 56 UG/DL (ref 30–160)
LYMPHOCYTES # BLD AUTO: 2.03 X10*3/UL (ref 1.2–4.8)
LYMPHOCYTES NFR BLD AUTO: 23.9 %
MCH RBC QN AUTO: 29.8 PG (ref 26–34)
MCHC RBC AUTO-ENTMCNC: 32.6 G/DL (ref 32–36)
MCV RBC AUTO: 92 FL (ref 80–100)
MONOCYTES # BLD AUTO: 0.44 X10*3/UL (ref 0.1–1)
MONOCYTES NFR BLD AUTO: 5.2 %
NEUTROPHILS # BLD AUTO: 5.79 X10*3/UL (ref 1.2–7.7)
NEUTROPHILS NFR BLD AUTO: 68 %
NRBC BLD-RTO: 0 /100 WBCS (ref 0–0)
PLATELET # BLD AUTO: 271 X10*3/UL (ref 150–450)
POTASSIUM SERPL-SCNC: 4 MMOL/L (ref 3.4–5.1)
PROT SERPL-MCNC: 7 G/DL (ref 5.9–7.9)
PTH-INTACT SERPL-MCNC: 62.9 PG/ML (ref 18.5–88)
RBC # BLD AUTO: 4.83 X10*6/UL (ref 4–5.2)
SODIUM SERPL-SCNC: 139 MMOL/L (ref 133–145)
TIBC SERPL-MCNC: 272 UG/DL (ref 228–428)
UIBC SERPL-MCNC: 216 UG/DL (ref 110–370)
VIT B12 SERPL-MCNC: 504 PG/ML (ref 211–946)
WBC # BLD AUTO: 8.5 X10*3/UL (ref 4.4–11.3)

## 2024-06-26 PROCEDURE — 84630 ASSAY OF ZINC: CPT

## 2024-06-26 PROCEDURE — 82746 ASSAY OF FOLIC ACID SERUM: CPT

## 2024-06-26 PROCEDURE — 82728 ASSAY OF FERRITIN: CPT

## 2024-06-26 PROCEDURE — 83970 ASSAY OF PARATHORMONE: CPT

## 2024-06-26 PROCEDURE — 80053 COMPREHEN METABOLIC PANEL: CPT

## 2024-06-26 PROCEDURE — 84425 ASSAY OF VITAMIN B-1: CPT

## 2024-06-26 PROCEDURE — 83550 IRON BINDING TEST: CPT

## 2024-06-26 PROCEDURE — 82525 ASSAY OF COPPER: CPT

## 2024-06-26 PROCEDURE — 82306 VITAMIN D 25 HYDROXY: CPT

## 2024-06-26 PROCEDURE — 36415 COLL VENOUS BLD VENIPUNCTURE: CPT

## 2024-06-26 PROCEDURE — 85025 COMPLETE CBC W/AUTO DIFF WBC: CPT

## 2024-06-26 PROCEDURE — 82607 VITAMIN B-12: CPT

## 2024-06-26 PROCEDURE — 83540 ASSAY OF IRON: CPT

## 2024-06-27 ENCOUNTER — APPOINTMENT (OUTPATIENT)
Dept: SURGERY | Facility: CLINIC | Age: 69
End: 2024-06-27
Payer: COMMERCIAL

## 2024-06-27 ENCOUNTER — NUTRITION (OUTPATIENT)
Dept: SURGERY | Facility: CLINIC | Age: 69
End: 2024-06-27

## 2024-06-27 VITALS
HEART RATE: 94 BPM | HEIGHT: 58 IN | SYSTOLIC BLOOD PRESSURE: 163 MMHG | WEIGHT: 252 LBS | DIASTOLIC BLOOD PRESSURE: 67 MMHG | BODY MASS INDEX: 52.9 KG/M2

## 2024-06-27 DIAGNOSIS — E53.8 VITAMIN B12 DEFICIENCY: ICD-10-CM

## 2024-06-27 DIAGNOSIS — E21.3 HYPERPARATHYROIDISM (MULTI): ICD-10-CM

## 2024-06-27 DIAGNOSIS — E55.9 VITAMIN D DEFICIENCY: ICD-10-CM

## 2024-06-27 DIAGNOSIS — Z98.84 S/P LAPAROSCOPIC SLEEVE GASTRECTOMY: ICD-10-CM

## 2024-06-27 DIAGNOSIS — K90.9 INTESTINAL MALABSORPTION, UNSPECIFIED TYPE (HHS-HCC): Primary | ICD-10-CM

## 2024-06-27 PROCEDURE — 3077F SYST BP >= 140 MM HG: CPT | Performed by: SURGERY

## 2024-06-27 PROCEDURE — 1126F AMNT PAIN NOTED NONE PRSNT: CPT | Performed by: SURGERY

## 2024-06-27 PROCEDURE — 3078F DIAST BP <80 MM HG: CPT | Performed by: SURGERY

## 2024-06-27 PROCEDURE — 99214 OFFICE O/P EST MOD 30 MIN: CPT | Performed by: SURGERY

## 2024-06-27 PROCEDURE — 3008F BODY MASS INDEX DOCD: CPT | Performed by: SURGERY

## 2024-06-27 PROCEDURE — 1159F MED LIST DOCD IN RCRD: CPT | Performed by: SURGERY

## 2024-06-27 ASSESSMENT — PAIN SCALES - GENERAL: PAINLEVEL: 0-NO PAIN

## 2024-06-27 NOTE — H&P
History Of Present Illness  Nisa Guzman is a 68 y.o. woman [place] for 3 [place] [facility]  She has lost 36% of her excess weight.  She feels great.   She has no reflux.  She can stop her omeprazole.  She does not have a gallbladder.  She has minimal hunger.  She eats an egg for breakfast, 1/4 cup of cottage cheese with fruit cup for lunch, turkey burger or [employer] chili for dinner.  She has a protein bar for a snack if she gets hungry.  Alanna walks daily for an hour.  She has not been taking an mvi daily because the chewables make her nauseated.  She takes calcium citrate bid.  She had labwork for this visit.  Her labs are normal.       Past Medical History  She has a past medical history of Acid reflux, Arthritis, Asthma ([facility]), DM (diabetes mellitus) (Multi), Fibromyalgia, Hyperlipidemia, Hypertension, Macular degeneration, Osteoarthritis, Polymyalgia (Multi), Skin cancer, Sleep apnea, and Thyroid nodule.    Surgical History  She has a past surgical history that includes Cholecystectomy (1987); Laparoscopy gastrectomy partial / total (03/20/2024); Total knee arthroplasty; Carpal tunnel release (2019); Total wrist arthroplasty (2021); Joint replacement (2021); Joint replacement (Left, 2021); Cataract extraction (2005); Other surgical history; and Other surgical history.     Social History  She reports that she has never smoked. She has never been exposed to tobacco smoke. She has never used smokeless tobacco. She reports that she does not drink alcohol and does not use drugs.    Family History  Family History   Problem Relation Name Age of Onset    Obesity Mother          morbid    Diabetes Mother      Hypertension Mother      Lung cancer Mother      Hypertension Father      Melanoma Father      Stroke Father      Heart attack Father          Allergies  Cortisone, Naproxen sodium, Penicillin, and Adhesive    Review of Systems   Bariatric Surgery F/U:          Seen at ER after surgery? No.  Hospital  "admission? No.  Bariatric reoperation? No.  Bariatric intervention? No.  Was anticoagulation initiated for presumed/confirmed Vein Thrombosis/PE? No.  Was an incisional hernia noted on exam? No.  Sleep Apnea? YES Still using C-PAP? No.  GERD req. meds? YES  Hyperlipidemia? No.  Still taking Cholesterol med? No.  Hypertension? YES.  Still taking HTN med? YES  Number of Anti-hypertensive Medications 2 Diabetes?YES.  Still taking DM med? No.  Current DM medication type: _____.         CONSTITUTIONAL:          Chills No.  Fatigue No.  Fever No.         CARDIOLOGY:          Negative for dizziness, chest pain, palpitations, shortness of breath.         RESPIRATORY:          Negative for chest congestion, cough, wheezing.         GASTROENTEROLOGY:          Food Intolerance YES  Acid reflux YES  Abdominal pain No.  Black stools No.  Constipation  YES  Diarrhea  YES  Loss of appetite no.  Nausea   YES Vomiting  YES.         UROLOGY:          Negative for dysuria, urinary urgency, kidney stones.         PSYCHOLOGY:          Negative for depression, anxiety, high stress level.        Last Recorded Vitals  Blood pressure 163/67, pulse 94, height 1.48 m (4' 10.25\"), weight 114 kg (252 lb).    Relevant Results   Latest Reference Range & Units 06/26/24 14:13   GLUCOSE 65 - 99 mg/dL 89   SODIUM 133 - 145 mmol/L 139   POTASSIUM 3.4 - 5.1 mmol/L 4.0   CHLORIDE 97 - 107 mmol/L 101   Bicarbonate 24 - 31 mmol/L 24   Anion Gap <=19 mmol/L 14   Blood Urea Nitrogen 8 - 25 mg/dL 15   Creatinine 0.40 - 1.60 mg/dL 0.60   EGFR >60 mL/min/1.73m*2 >90   Calcium 8.5 - 10.4 mg/dL 9.1   Albumin 3.5 - 5.0 g/dL 3.9   Alkaline Phosphatase 35 - 125 U/L 165 (H)   ALT 5 - 40 U/L 31   AST 5 - 40 U/L 36   Bilirubin Total 0.1 - 1.2 mg/dL 0.4   FERRITIN 13 - 150 ng/mL 123   FOLATE 4.2 - 19.9 ng/mL 7.8   Total Protein 5.9 - 7.9 g/dL 7.0   IRON 30 - 160 ug/dL 56   TIBC 228 - 428 ug/dL 272   UIBC 110 - 370 ug/dL 216   % Saturation 12 - 50 % 21   Vitamin B12 211 " - 946 pg/mL 504   Parathyroid Hormone, Intact 18.5 - 88.0 pg/mL 62.9   Vitamin D, 25-Hydroxy, Total 31 - 100 ng/mL 35   LEUKOCYTES (10*3/UL) IN BLOOD BY AUTOMATED COUNT, Chinese 4.4 - 11.3 x10*3/uL 8.5   nRBC 0.0 - 0.0 /100 WBCs 0.0   ERYTHROCYTES (10*6/UL) IN BLOOD BY AUTOMATED COUNT, Chinese 4.00 - 5.20 x10*6/uL 4.83   HEMOGLOBIN 12.0 - 16.0 g/dL 14.4   HEMATOCRIT 36.0 - 46.0 % 44.2   MCV 80 - 100 fL 92   MCH 26.0 - 34.0 pg 29.8   MCHC 32.0 - 36.0 g/dL 32.6   RED CELL DISTRIBUTION WIDTH 11.5 - 14.5 % 13.3   PLATELETS (10*3/UL) IN BLOOD AUTOMATED COUNT, Chinese 150 - 450 x10*3/uL 271   NEUTROPHILS/100 LEUKOCYTES IN BLOOD BY AUTOMATED COUNT, Chinese 40.0 - 80.0 % 68.0   Immature Granulocytes %, Automated 0.0 - 0.9 % 0.2   Lymphocytes % 13.0 - 44.0 % 23.9   Monocytes % 2.0 - 10.0 % 5.2   Eosinophils % 0.0 - 6.0 % 2.0   Basophils % 0.0 - 2.0 % 0.7   NEUTROPHILS (10*3/UL) IN BLOOD BY AUTOMATED COUNT, Chinese 1.20 - 7.70 x10*3/uL 5.79   Immature Granulocytes Absolute, Automated 0.00 - 0.70 x10*3/uL 0.02   Lymphocytes Absolute 1.20 - 4.80 x10*3/uL 2.03   Monocytes Absolute 0.10 - 1.00 x10*3/uL 0.44   Eosinophils Absolute 0.00 - 0.70 x10*3/uL 0.17   Basophils Absolute 0.00 - 0.10 x10*3/uL 0.06   (H): Data is abnormally high         Assessment/Plan   Problem List Items Addressed This Visit             ICD-10-CM    Intestinal malabsorption ([facility]-HCC) - Primary K90.9    Relevant Orders    CBC and Auto Differential    Comprehensive Metabolic Panel    Copper, Blood    Ferritin    Folate    Iron and TIBC    Parathyroid Hormone, Intact    Vitamin B1, Whole Blood    Vitamin B12    Vitamin D 25-Hydroxy,Total (for eval of Vitamin D levels)    Zinc, Serum or Plasma    Vitamin D deficiency E55.9    Relevant Orders    Parathyroid Hormone, Intact    Vitamin D 25-Hydroxy,Total (for eval of Vitamin D levels)    Vitamin B12 deficiency E53.8    Relevant Orders    Vitamin B12    Hyperparathyroidism (Multi) E21.3    Relevant Orders     Parathyroid Hormone, Intact    Vitamin D 25-Hydroxy,Total (for eval of Vitamin D levels)    S/P laparoscopic sleeve gastrectomy Z98.84       We reviewed the rules necessary for long term success after weight loss surgery.  We discussed the need for lifelong nutritional supplementation after weight loss surgery and they were given a handout.  We will obtain labwork at next visit.  We discussed the importance of excercising with moderate intensity a minimum of five days per week for at least 30 minutes.  She will stop her ppi and call if she has symptoms.       Victorino Barry MD

## 2024-06-27 NOTE — PROGRESS NOTES
Bariatric Post-Op Follow Up Appointment: 3 mos VSG     Current Weight: 252 lb  Current BMI: 52.22  Percentage of weight loss achieved: 35    Dietary Intake: the lifelong rules   Breakfast- usually 1 egg. Sometimes an additional 1-2 turkey sausage links. OR cottage cheese with a no sugar added fruit cup   Lunch- a little can of tuna with miracle whip, OR cottage cheese with a no sugar added fruit cup, OR a couple pieces of low-fat cheese and deli turkey   Dinner- a turkey burger, OR fish, OR turkey kielbasa  Volume of food at a time: estimated 2-2.5oz/meal   Snacks: maybe a protein bar, protein shake or [employer] chips in the evening if needed to hit protein goals   Beverages: water, protein shake, Gatorade Zero, Fairlife low-fat milk   Alcohol Intake: none   Do you drink with your meals? No   Current Exercise: mostly walking for 1 hour everyday. Sometimes walking closer to 15-20 minutes due to back pain.   Vitamins/minerals: Celebrate 45 MVI 1x/day, calcium citrate 2x/week   Food intolerances? None   Any decrease in energy levels? No   Any questions or concerns? No       Keiry Herndon RD, LDN  Registered Dietitian, Licensed Dietitian Nutritionist

## 2024-06-28 LAB
COPPER SERPL-MCNC: 140.6 UG/DL (ref 80–155)
ZINC SERPL-MCNC: 113 UG/DL (ref 60–120)

## 2024-07-02 LAB — VIT B1 PYROPHOSHATE BLD-SCNC: 84 NMOL/L (ref 70–180)

## 2024-07-09 ENCOUNTER — OFFICE VISIT (OUTPATIENT)
Dept: CARDIOLOGY | Facility: CLINIC | Age: 69
End: 2024-07-09
Payer: COMMERCIAL

## 2024-07-09 VITALS
OXYGEN SATURATION: 96 % | SYSTOLIC BLOOD PRESSURE: 128 MMHG | DIASTOLIC BLOOD PRESSURE: 78 MMHG | BODY MASS INDEX: 52.42 KG/M2 | HEART RATE: 57 BPM | WEIGHT: 253 LBS

## 2024-07-09 DIAGNOSIS — E78.49 OTHER HYPERLIPIDEMIA: ICD-10-CM

## 2024-07-09 DIAGNOSIS — I10 ESSENTIAL HYPERTENSION: Primary | ICD-10-CM

## 2024-07-09 PROCEDURE — 1125F AMNT PAIN NOTED PAIN PRSNT: CPT | Performed by: INTERNAL MEDICINE

## 2024-07-09 PROCEDURE — 1159F MED LIST DOCD IN RCRD: CPT | Performed by: INTERNAL MEDICINE

## 2024-07-09 PROCEDURE — 99213 OFFICE O/P EST LOW 20 MIN: CPT | Performed by: INTERNAL MEDICINE

## 2024-07-09 PROCEDURE — 3078F DIAST BP <80 MM HG: CPT | Performed by: INTERNAL MEDICINE

## 2024-07-09 PROCEDURE — 1036F TOBACCO NON-USER: CPT | Performed by: INTERNAL MEDICINE

## 2024-07-09 PROCEDURE — 3008F BODY MASS INDEX DOCD: CPT | Performed by: INTERNAL MEDICINE

## 2024-07-09 PROCEDURE — 3074F SYST BP LT 130 MM HG: CPT | Performed by: INTERNAL MEDICINE

## 2024-07-09 ASSESSMENT — ENCOUNTER SYMPTOMS
DEPRESSION: 0
OCCASIONAL FEELINGS OF UNSTEADINESS: 0
IRREGULAR HEARTBEAT: 0
FEVER: 0
SHORTNESS OF BREATH: 0
WEIGHT LOSS: 0
CLAUDICATION: 0
NEAR-SYNCOPE: 0
MYALGIAS: 0
PALPITATIONS: 0
DYSPNEA ON EXERTION: 0
SYNCOPE: 0
PND: 0
DIZZINESS: 0
WEAKNESS: 0
ORTHOPNEA: 0
DIAPHORESIS: 0
COUGH: 0
WEIGHT GAIN: 0
LOSS OF SENSATION IN FEET: 0
WHEEZING: 0

## 2024-07-09 ASSESSMENT — PAIN SCALES - GENERAL: PAINLEVEL: 2

## 2024-07-09 NOTE — PROGRESS NOTES
Subjective      Chief Complaint   Patient presents with    Follow-up     6 month follow up        68-year-old female with a history of hypertension, hyperlipidemia, morbid obesity with recent sleeve gastrectomy in March 2024.  She presents for cardiac follow-up. She has been walking for exercise. She has lost 72 lbs thus far. BP is better. She has no issues with chest pain or dyspnea.          Review of Systems   Constitutional: Negative for diaphoresis, fever, weight gain and weight loss.   Eyes:  Negative for visual disturbance.   Cardiovascular:  Negative for chest pain, claudication, dyspnea on exertion, irregular heartbeat, leg swelling, near-syncope, orthopnea, palpitations, paroxysmal nocturnal dyspnea and syncope.   Respiratory:  Negative for cough, shortness of breath and wheezing.    Musculoskeletal:  Negative for muscle weakness and myalgias.   Neurological:  Negative for dizziness and weakness.   All other systems reviewed and are negative.       Past Medical History:   Diagnosis Date    Acid reflux     Arthritis     Asthma ([facility])     DM (diabetes mellitus) (Multi)     Fibromyalgia     Hyperlipidemia     Hypertension     Macular degeneration     Osteoarthritis     Polymyalgia (Multi)     Skin cancer     Sleep apnea     Thyroid nodule         Past Surgical History:   Procedure Laterality Date    CARPAL TUNNEL RELEASE  2019    CATARACT EXTRACTION  2005    CHOLECYSTECTOMY  1987    JOINT REPLACEMENT  2021    RT THUMB    JOINT REPLACEMENT Left 2021    LEFT THUMB    LAPAROSCOPY GASTRECTOMY PARTIAL / TOTAL  03/20/2024    LAPAROSCOPIC VERTICAL SLEEVE GASTRECTOMY, LYSIS OF ADHESIONS ([facility])    OTHER SURGICAL HISTORY      MELANOMA    OTHER SURGICAL HISTORY      MELANOMA    TOTAL KNEE ARTHROPLASTY      TOTAL WRIST ARTHROPLASTY  2021        Social History     Socioeconomic History    Marital status:      Spouse name: Not on file    Number of children: Not on file    Years of education: Not on file     Highest education level: Not on file   Occupational History    Not on file   Tobacco Use    Smoking status: Never     Passive exposure: Never    Smokeless tobacco: Never   Vaping Use    Vaping status: Never Used   Substance and Sexual Activity    Alcohol use: Never    Drug use: Never    Sexual activity: Defer   Other Topics Concern    Not on file   Social History Narrative    Not on file     Social Determinants of Health     Financial Resource Strain: Low Risk  (3/21/2024)    Overall Financial Resource Strain (CARDIA)     Difficulty of Paying Living Expenses: Not hard at all   Food Insecurity: No Food Insecurity (3/21/2024)    Hunger Vital Sign     Worried About Running Out of Food in the Last Year: Never true     Ran Out of Food in the Last Year: Never true   Transportation Needs: No Transportation Needs (3/21/2024)    PRAPARE - Transportation     Lack of Transportation (Medical): No     Lack of Transportation (Non-Medical): No   Physical Activity: Inactive (3/21/2024)    Exercise Vital Sign     Days of Exercise per Week: 0 days     Minutes of Exercise per Session: 0 min   Stress: No Stress Concern Present (3/21/2024)    [employer] [place] [employer] - Occupational Stress Questionnaire     Feeling of Stress : Not at all   Social Connections: Moderately Isolated (3/21/2024)    Social Connection and Isolation Panel [NHANES]     Frequency of Communication with Friends and Family: More than three times a week     Frequency of Social Gatherings with Friends and Family: Three times a week     Attends [employer] Services: Never     Active Member of Clubs or Organizations: No     Attends Club or Organization Meetings: Never     Marital Status:    Intimate Partner Violence: Not At Risk (3/21/2024)    Humiliation, Afraid, Rape, and Kick questionnaire     Fear of Current or Ex-Partner: No     Emotionally Abused: No     Physically Abused: No     Sexually Abused: No   Housing Stability: Low Risk  (3/21/2024)     Housing Stability Vital Sign     Unable to Pay for Housing in the Last Year: No     Number of Places Lived in the Last Year: 1     Unstable Housing in the Last Year: No        Family History   Problem Relation Name Age of Onset    Obesity Mother          morbid    Diabetes Mother      Hypertension Mother      Lung cancer Mother      Hypertension Father      Melanoma Father      Stroke Father      Heart attack Father          OBJECTIVE:    Vitals:    07/09/24 1314   BP: 128/78   Pulse: 57   SpO2: 96%        Vitals reviewed.   Constitutional:       Appearance: Normal and healthy appearance. Not in distress.   Pulmonary:      Effort: Pulmonary effort is normal.      Breath sounds: Normal breath sounds.   Cardiovascular:      Normal rate. Regular rhythm. Normal S1. Normal S2.       Murmurs: There is no murmur.      No gallop.  No click.   Pulses:     Intact distal pulses.   Edema:     Peripheral edema absent.   Skin:     General: Skin is warm and dry.   Neurological:      General: No focal deficit present.          Lab Review:   Lab Results   Component Value Date     06/26/2024    K 4.0 06/26/2024     06/26/2024    CO2 24 06/26/2024    BUN 15 06/26/2024    CREATININE 0.60 06/26/2024    GLUCOSE 89 06/26/2024    CALCIUM 9.1 06/26/2024     Lab Results   Component Value Date    CHOL 137 10/25/2023    TRIG 177 (H) 10/25/2023    HDL 56.0 10/25/2023       Lab Results   Component Value Date    LDLCALC 46 (L) 10/25/2023        Other hyperlipidemia  Lipids checked annually, last was October they were excellent. Glycemic control is much better after surgery.     Essential hypertension  Controlled. Will continue norvasc and amlodipine

## 2024-07-09 NOTE — ASSESSMENT & PLAN NOTE
Lipids checked annually, last was October they were excellent. Glycemic control is much better after surgery.

## 2024-09-10 ENCOUNTER — TELEPHONE (OUTPATIENT)
Dept: SURGERY | Facility: CLINIC | Age: 69
End: 2024-09-10
Payer: COMMERCIAL

## 2024-09-10 NOTE — PROGRESS NOTES
Subjective   Patient ID: Nisa Guzman is a 68 y.o. female who presents for No chief complaint on file..  HPI  6 MOS FUV SLEEVE  START WT: 320  IDEAL WT:125 START EXCESS: 195 HT:   58.25  IN  LVM FOR LAB/ APPT REMINDER  Review of Systems  Bariatric Surgery F/U:          Seen at ER after surgery? No.  Hospital admission? No.  Bariatric reoperation? No.  Bariatric intervention? No.  Was anticoagulation initiated for presumed/confirmed Vein Thrombosis/PE? No.  Was an incisional hernia noted on exam? No.  Sleep Apnea? noStill using C-PAP? No.  GERD req. meds? YES  Hyperlipidemia? No.  Still taking Cholesterol med? No.  Hypertension? YES.  Still taking HTN med? YES  Number of Anti-hypertensive Medications 2 Diabetes?YES.  Still taking DM med? No.  Current DM medication type: _____.         CONSTITUTIONAL:          Chills No.  Fatigue No.  Fever No.         CARDIOLOGY:          Negative for dizziness, chest pain, palpitations, shortness of breath.         RESPIRATORY:          Negative for chest congestion, cough, wheezing.         GASTROENTEROLOGY:          Food Intolerance YES  Acid reflux no  Abdominal pain No.  Black stools No.  Constipation  no  Diarrhea  no Loss of appetite no.  Nausea   no Vomiting  no.         UROLOGY:          Negative for dysuria, urinary urgency, kidney stones.         PSYCHOLOGY:          Negative for depression, anxiety, high stress level.   Objective   Physical Exam    Assessment/Plan            Saira Gutiérrez LPN 09/10/24 2:36 PM

## 2024-09-17 ENCOUNTER — TELEPHONE (OUTPATIENT)
Dept: SURGERY | Facility: CLINIC | Age: 69
End: 2024-09-17
Payer: COMMERCIAL

## 2024-09-17 NOTE — TELEPHONE ENCOUNTER
LAB/ APPT REMINDER   PLEASE HAVE LABS DONE PRIOR TO THE NEXT APPT AND HAVE THE RESULTS SENT TO THE OFFICE. THANK YOU!

## 2024-09-18 ENCOUNTER — LAB (OUTPATIENT)
Dept: LAB | Facility: LAB | Age: 69
End: 2024-09-18
Payer: COMMERCIAL

## 2024-09-18 DIAGNOSIS — K90.9 INTESTINAL MALABSORPTION, UNSPECIFIED TYPE (HHS-HCC): ICD-10-CM

## 2024-09-18 DIAGNOSIS — E53.8 VITAMIN B12 DEFICIENCY: ICD-10-CM

## 2024-09-18 DIAGNOSIS — E21.3 HYPERPARATHYROIDISM (MULTI): ICD-10-CM

## 2024-09-18 DIAGNOSIS — E55.9 VITAMIN D DEFICIENCY: ICD-10-CM

## 2024-09-18 LAB
25(OH)D3 SERPL-MCNC: 56 NG/ML (ref 30–100)
ALBUMIN SERPL BCP-MCNC: 3.8 G/DL (ref 3.4–5)
ALP SERPL-CCNC: 66 U/L (ref 33–136)
ALT SERPL W P-5'-P-CCNC: 19 U/L (ref 7–45)
ANION GAP SERPL CALCULATED.3IONS-SCNC: 13 MMOL/L (ref 10–20)
AST SERPL W P-5'-P-CCNC: 25 U/L (ref 9–39)
BILIRUB SERPL-MCNC: 0.3 MG/DL (ref 0–1.2)
BUN SERPL-MCNC: 18 MG/DL (ref 6–23)
CALCIUM SERPL-MCNC: 8.8 MG/DL (ref 8.6–10.3)
CHLORIDE SERPL-SCNC: 103 MMOL/L (ref 98–107)
CO2 SERPL-SCNC: 28 MMOL/L (ref 21–32)
CREAT SERPL-MCNC: 0.69 MG/DL (ref 0.5–1.05)
EGFRCR SERPLBLD CKD-EPI 2021: >90 ML/MIN/1.73M*2
FERRITIN SERPL-MCNC: 96 NG/ML (ref 8–150)
FOLATE SERPL-MCNC: >24 NG/ML
GLUCOSE SERPL-MCNC: 69 MG/DL (ref 74–99)
IRON SATN MFR SERPL: 16 % (ref 25–45)
IRON SERPL-MCNC: 44 UG/DL (ref 35–150)
POTASSIUM SERPL-SCNC: 4.8 MMOL/L (ref 3.5–5.3)
PROT SERPL-MCNC: 6.4 G/DL (ref 6.4–8.2)
PTH-INTACT SERPL-MCNC: 45.3 PG/ML (ref 18.5–88)
SODIUM SERPL-SCNC: 139 MMOL/L (ref 136–145)
TIBC SERPL-MCNC: 279 UG/DL (ref 240–445)
UIBC SERPL-MCNC: 235 UG/DL (ref 110–370)
VIT B12 SERPL-MCNC: 820 PG/ML (ref 211–911)

## 2024-09-18 PROCEDURE — 82746 ASSAY OF FOLIC ACID SERUM: CPT

## 2024-09-18 PROCEDURE — 80053 COMPREHEN METABOLIC PANEL: CPT

## 2024-09-18 PROCEDURE — 82607 VITAMIN B-12: CPT

## 2024-09-18 PROCEDURE — 84630 ASSAY OF ZINC: CPT

## 2024-09-18 PROCEDURE — 83970 ASSAY OF PARATHORMONE: CPT

## 2024-09-18 PROCEDURE — 83540 ASSAY OF IRON: CPT

## 2024-09-18 PROCEDURE — 82525 ASSAY OF COPPER: CPT

## 2024-09-18 PROCEDURE — 36415 COLL VENOUS BLD VENIPUNCTURE: CPT

## 2024-09-18 PROCEDURE — 82728 ASSAY OF FERRITIN: CPT

## 2024-09-18 PROCEDURE — 82306 VITAMIN D 25 HYDROXY: CPT

## 2024-09-18 PROCEDURE — 83550 IRON BINDING TEST: CPT

## 2024-09-18 PROCEDURE — 84425 ASSAY OF VITAMIN B-1: CPT

## 2024-09-19 ENCOUNTER — TELEPHONE (OUTPATIENT)
Dept: SURGERY | Facility: CLINIC | Age: 69
End: 2024-09-19

## 2024-09-19 ENCOUNTER — NUTRITION (OUTPATIENT)
Dept: SURGERY | Facility: CLINIC | Age: 69
End: 2024-09-19

## 2024-09-19 ENCOUNTER — APPOINTMENT (OUTPATIENT)
Dept: SURGERY | Facility: CLINIC | Age: 69
End: 2024-09-19
Payer: COMMERCIAL

## 2024-09-19 ENCOUNTER — LAB (OUTPATIENT)
Dept: LAB | Facility: LAB | Age: 69
End: 2024-09-19
Payer: COMMERCIAL

## 2024-09-19 VITALS
HEIGHT: 58 IN | BODY MASS INDEX: 50.8 KG/M2 | WEIGHT: 242 LBS | DIASTOLIC BLOOD PRESSURE: 69 MMHG | HEART RATE: 76 BPM | SYSTOLIC BLOOD PRESSURE: 131 MMHG

## 2024-09-19 DIAGNOSIS — E55.9 VITAMIN D DEFICIENCY: ICD-10-CM

## 2024-09-19 DIAGNOSIS — E21.3 HYPERPARATHYROIDISM (MULTI): ICD-10-CM

## 2024-09-19 DIAGNOSIS — E61.1 IRON DEFICIENCY: ICD-10-CM

## 2024-09-19 DIAGNOSIS — K90.9 INTESTINAL MALABSORPTION, UNSPECIFIED TYPE (HHS-HCC): ICD-10-CM

## 2024-09-19 DIAGNOSIS — E53.8 VITAMIN B12 DEFICIENCY: ICD-10-CM

## 2024-09-19 DIAGNOSIS — Z98.84 S/P LAPAROSCOPIC SLEEVE GASTRECTOMY: ICD-10-CM

## 2024-09-19 DIAGNOSIS — K90.9 INTESTINAL MALABSORPTION, UNSPECIFIED TYPE (HHS-HCC): Primary | ICD-10-CM

## 2024-09-19 LAB
BASOPHILS # BLD AUTO: 0.05 X10*3/UL (ref 0–0.1)
BASOPHILS NFR BLD AUTO: 0.6 %
EOSINOPHIL # BLD AUTO: 0.17 X10*3/UL (ref 0–0.7)
EOSINOPHIL NFR BLD AUTO: 2.2 %
ERYTHROCYTE [DISTWIDTH] IN BLOOD BY AUTOMATED COUNT: 13.8 % (ref 11.5–14.5)
HCT VFR BLD AUTO: 40.9 % (ref 36–46)
HGB BLD-MCNC: 13.5 G/DL (ref 12–16)
IMM GRANULOCYTES # BLD AUTO: 0.02 X10*3/UL (ref 0–0.7)
IMM GRANULOCYTES NFR BLD AUTO: 0.3 % (ref 0–0.9)
LYMPHOCYTES # BLD AUTO: 2.7 X10*3/UL (ref 1.2–4.8)
LYMPHOCYTES NFR BLD AUTO: 34.3 %
MCH RBC QN AUTO: 30.5 PG (ref 26–34)
MCHC RBC AUTO-ENTMCNC: 33 G/DL (ref 32–36)
MCV RBC AUTO: 92 FL (ref 80–100)
MONOCYTES # BLD AUTO: 0.51 X10*3/UL (ref 0.1–1)
MONOCYTES NFR BLD AUTO: 6.5 %
NEUTROPHILS # BLD AUTO: 4.42 X10*3/UL (ref 1.2–7.7)
NEUTROPHILS NFR BLD AUTO: 56.1 %
NRBC BLD-RTO: 0 /100 WBCS (ref 0–0)
PLATELET # BLD AUTO: 239 X10*3/UL (ref 150–450)
RBC # BLD AUTO: 4.43 X10*6/UL (ref 4–5.2)
WBC # BLD AUTO: 7.9 X10*3/UL (ref 4.4–11.3)

## 2024-09-19 PROCEDURE — 1159F MED LIST DOCD IN RCRD: CPT

## 2024-09-19 PROCEDURE — 3008F BODY MASS INDEX DOCD: CPT

## 2024-09-19 PROCEDURE — 99214 OFFICE O/P EST MOD 30 MIN: CPT

## 2024-09-19 PROCEDURE — 85025 COMPLETE CBC W/AUTO DIFF WBC: CPT

## 2024-09-19 PROCEDURE — 36415 COLL VENOUS BLD VENIPUNCTURE: CPT

## 2024-09-19 PROCEDURE — 3075F SYST BP GE 130 - 139MM HG: CPT

## 2024-09-19 PROCEDURE — 3078F DIAST BP <80 MM HG: CPT

## 2024-09-19 PROCEDURE — 1036F TOBACCO NON-USER: CPT

## 2024-09-19 PROCEDURE — 1126F AMNT PAIN NOTED NONE PRSNT: CPT

## 2024-09-19 RX ORDER — ACETAMINOPHEN 500 MG
500 TABLET ORAL EVERY 6 HOURS PRN
COMMUNITY

## 2024-09-19 ASSESSMENT — PATIENT HEALTH QUESTIONNAIRE - PHQ9
2. FEELING DOWN, DEPRESSED OR HOPELESS: NOT AT ALL
SUM OF ALL RESPONSES TO PHQ9 QUESTIONS 1 AND 2: 0
1. LITTLE INTEREST OR PLEASURE IN DOING THINGS: NOT AT ALL

## 2024-09-19 ASSESSMENT — COLUMBIA-SUICIDE SEVERITY RATING SCALE - C-SSRS
2. HAVE YOU ACTUALLY HAD ANY THOUGHTS OF KILLING YOURSELF?: NO
1. IN THE PAST MONTH, HAVE YOU WISHED YOU WERE DEAD OR WISHED YOU COULD GO TO SLEEP AND NOT WAKE UP?: NO
6. HAVE YOU EVER DONE ANYTHING, STARTED TO DO ANYTHING, OR PREPARED TO DO ANYTHING TO END YOUR LIFE?: NO

## 2024-09-19 ASSESSMENT — PAIN SCALES - GENERAL: PAINLEVEL: 0-NO PAIN

## 2024-09-19 NOTE — PROGRESS NOTES
Subjective   Patient ID: Nisa Guzman is a 68 y.o. female who presents for Follow-up (6 mos fuv sleeve).  ABDULAZIZ Paula is here for her 6 month follow up after a VSG. She has lost 40% of her excess weight. She denies reflux. She tells me she feels wonderful. She is still taking omeprazole. She tells me that she was able to walk 3 stores without needing a break. She only feels hungry after dinner time. She will have a quest cookie or a sugar free popsicle after dinner. She can only eat 1 egg for breakfast before feeling full. She walks every day for exercise. She is currently taking celebrate 18, calcium citrate BID, biotin, and vitamin complex. She had labwork for this appointment, which we reviewed.       Objective   Physical Exam  Constitutional:       Appearance: Normal appearance.   Eyes:      Pupils: Pupils are equal, round, and reactive to light.   Cardiovascular:      Rate and Rhythm: Normal rate.   Pulmonary:      Effort: Pulmonary effort is normal.   Abdominal:      Palpations: Abdomen is soft.   Musculoskeletal:         General: Normal range of motion.   Skin:     General: Skin is warm and dry.   Neurological:      General: No focal deficit present.      Mental Status: She is alert and oriented to person, place, and time.   Psychiatric:         Mood and Affect: Mood normal.           Latest Reference Range & Units 09/18/24 15:28   GLUCOSE 74 - 99 mg/dL 69 (L)   SODIUM 136 - 145 mmol/L 139   POTASSIUM 3.5 - 5.3 mmol/L 4.8   CHLORIDE 98 - 107 mmol/L 103   Bicarbonate 21 - 32 mmol/L 28   Anion Gap 10 - 20 mmol/L 13   Blood Urea Nitrogen 6 - 23 mg/dL 18   Creatinine 0.50 - 1.05 mg/dL 0.69   EGFR >60 mL/min/1.73m*2 >90   Calcium 8.6 - 10.3 mg/dL 8.8   Albumin 3.4 - 5.0 g/dL 3.8   Alkaline Phosphatase 33 - 136 U/L 66   ALT 7 - 45 U/L 19   AST 9 - 39 U/L 25   Bilirubin Total 0.0 - 1.2 mg/dL 0.3   FERRITIN 8 - 150 ng/mL 96   FOLATE >5.0 ng/mL >24.0   Total Protein 6.4 - 8.2 g/dL 6.4   IRON 35 - 150 ug/dL 44    TIBC 240 - 445 ug/dL 279   UIBC 110 - 370 ug/dL 235   % Saturation 25 - 45 % 16 (L)   Vitamin B12 211 - 911 pg/mL 820   Parathyroid Hormone, Intact 18.5 - 88.0 pg/mL 45.3   Vitamin D, 25-Hydroxy, Total 30 - 100 ng/mL 56     Assessment/Plan   Problem List Items Addressed This Visit             ICD-10-CM    Intestinal malabsorption ([facility]) - Primary K90.9    Relevant Orders    CBC and Auto Differential    Comprehensive Metabolic Panel    Copper, Blood    Ferritin    Folate    Iron and TIBC    Parathyroid Hormone, Intact    Vitamin B1, Whole Blood    Vitamin B12    Vitamin D 25-Hydroxy,Total (for eval of Vitamin D levels)    Zinc, Serum or Plasma    Vitamin D deficiency E55.9    Relevant Orders    Vitamin D 25-Hydroxy,Total (for eval of Vitamin D levels)    Vitamin B12 deficiency E53.8    Relevant Orders    Vitamin B12    Hyperparathyroidism (Multi) E21.3    Relevant Orders    Parathyroid Hormone, Intact    S/P laparoscopic sleeve gastrectomy Z98.84    Iron deficiency E61.1    Relevant Orders    Iron and TIBC     Nisa and I reviewed the lifelong rules. She will stop her PPI. She will increase her MVI to Celebrate 45. We will recheck her labwork at her next follow up in 6 months. We discussed reevaluating her incisional hernia at that time with Dr Barry.     SELENA Rodgers-CNP 09/19/24 1:30 PM

## 2024-09-19 NOTE — PROGRESS NOTES
Bariatric Post-Op Follow Up Appointment: 6 mos VSG    Current Weight: 242 lbs  Current BMI: 50.14  Percentage of weight loss achieved: 40    Dietary Intake:  Breakfast- 1 egg, OR fat-free cottage cheese with a no sugar added fruit cup  Lunch- 2 slices of deli turkey, OR canned tuna/chicken with light miracle whip and vegetables on side   Dinner- a protein (ex: chicken breast, turkey kielbasa, turkey italian sausage, lean meatloaf)   Volume of food at a time: 3 oz at most   Snacks: a sugar-free popsicle, a Quest protein cookie  Beverages: water, Gatorade Zero, crystal light, decaf coffee with sf creamer   Alcohol Intake: none   Do you drink with your meals? No   Current Exercise: walking around grocery stores   Vitamins/minerals: [id number] MVI 1x/day plus calcium citrate 2x/day. Pt was instructed to switch to [id number] MVI 1x/day to aid with low iron level.   Food intolerances? Green beans  Any decrease in energy levels? No, an increase per pt.   Any questions or concerns? Addressed dietary questions.       Keiry Herndon RD, LDN  Registered Dietitian, Licensed Dietitian Nutritionist

## 2024-09-21 LAB
COPPER SERPL-MCNC: 114.2 UG/DL (ref 80–155)
ZINC SERPL-MCNC: 82.8 UG/DL (ref 60–120)

## 2024-09-23 LAB — VIT B1 PYROPHOSHATE BLD-SCNC: 203 NMOL/L (ref 70–180)

## 2025-03-11 ENCOUNTER — TELEPHONE (OUTPATIENT)
Dept: SURGERY | Facility: CLINIC | Age: 70
End: 2025-03-11

## 2025-03-17 ENCOUNTER — TELEPHONE (OUTPATIENT)
Dept: SURGERY | Facility: CLINIC | Age: 70
End: 2025-03-17

## 2025-03-17 NOTE — TELEPHONE ENCOUNTER
SPOKE WITH PATIENT REQUESTED TO MOVE APPT IN ORDER TO HAVE MORE TIME TO GET LABS DONE/ NEEDS MET. PATIENT WILL HAVE LABS DONE PRIOR TO MARCH 26TH APPT AT 1 PM.   Lvm that appt will be a televisit for March 26, instructed patient to return call to the office to confirm that she received the message/ if patient would like an in person visit we will need to change the day for the appt.

## 2025-03-20 ENCOUNTER — APPOINTMENT (OUTPATIENT)
Dept: SURGERY | Facility: CLINIC | Age: 70
End: 2025-03-20
Payer: COMMERCIAL

## 2025-03-22 LAB
25(OH)D3+25(OH)D2 SERPL-MCNC: 51 NG/ML (ref 30–100)
ALBUMIN SERPL-MCNC: 3.9 G/DL (ref 3.6–5.1)
ALP SERPL-CCNC: 51 U/L (ref 37–153)
ALT SERPL-CCNC: 35 U/L (ref 6–29)
ANION GAP SERPL CALCULATED.4IONS-SCNC: 8 MMOL/L (CALC) (ref 7–17)
AST SERPL-CCNC: 28 U/L (ref 10–35)
BASOPHILS # BLD AUTO: 97 CELLS/UL (ref 0–200)
BASOPHILS NFR BLD AUTO: 1.2 %
BILIRUB SERPL-MCNC: 0.4 MG/DL (ref 0.2–1.2)
BUN SERPL-MCNC: 23 MG/DL (ref 7–25)
CALCIUM SERPL-MCNC: 9.2 MG/DL (ref 8.6–10.4)
CHLORIDE SERPL-SCNC: 102 MMOL/L (ref 98–110)
CO2 SERPL-SCNC: 29 MMOL/L (ref 20–32)
COPPER BLD-MCNC: NORMAL UG/DL
CREAT SERPL-MCNC: 0.62 MG/DL (ref 0.5–1.05)
EGFRCR SERPLBLD CKD-EPI 2021: 98 ML/MIN/1.73M2
EOSINOPHIL # BLD AUTO: 130 CELLS/UL (ref 15–500)
EOSINOPHIL NFR BLD AUTO: 1.6 %
ERYTHROCYTE [DISTWIDTH] IN BLOOD BY AUTOMATED COUNT: 12.7 % (ref 11–15)
FERRITIN SERPL-MCNC: 54 NG/ML (ref 16–288)
FOLATE SERPL-MCNC: >24 NG/ML
GLUCOSE SERPL-MCNC: 77 MG/DL (ref 65–99)
HCT VFR BLD AUTO: 43.5 % (ref 35–45)
HGB BLD-MCNC: 14.5 G/DL (ref 11.7–15.5)
IRON SATN MFR SERPL: 24 % (CALC) (ref 16–45)
IRON SERPL-MCNC: 69 MCG/DL (ref 45–160)
LYMPHOCYTES # BLD AUTO: 2203 CELLS/UL (ref 850–3900)
LYMPHOCYTES NFR BLD AUTO: 27.2 %
MCH RBC QN AUTO: 30.2 PG (ref 27–33)
MCHC RBC AUTO-ENTMCNC: 33.3 G/DL (ref 32–36)
MCV RBC AUTO: 90.6 FL (ref 80–100)
MONOCYTES # BLD AUTO: 518 CELLS/UL (ref 200–950)
MONOCYTES NFR BLD AUTO: 6.4 %
NEUTROPHILS # BLD AUTO: 5152 CELLS/UL (ref 1500–7800)
NEUTROPHILS NFR BLD AUTO: 63.6 %
PLATELET # BLD AUTO: 285 THOUSAND/UL (ref 140–400)
PMV BLD REES-ECKER: 10.2 FL (ref 7.5–12.5)
POTASSIUM SERPL-SCNC: 4.1 MMOL/L (ref 3.5–5.3)
PROT SERPL-MCNC: 6.7 G/DL (ref 6.1–8.1)
PTH-INTACT SERPL-MCNC: 18 PG/ML (ref 16–77)
RBC # BLD AUTO: 4.8 MILLION/UL (ref 3.8–5.1)
SODIUM SERPL-SCNC: 139 MMOL/L (ref 135–146)
TIBC SERPL-MCNC: 293 MCG/DL (CALC) (ref 250–450)
VIT B1 BLD-SCNC: NORMAL NMOL/L
VIT B12 SERPL-MCNC: 754 PG/ML (ref 200–1100)
WBC # BLD AUTO: 8.1 THOUSAND/UL (ref 3.8–10.8)
ZINC SERPL-MCNC: NORMAL UG/ML

## 2025-03-26 ENCOUNTER — APPOINTMENT (OUTPATIENT)
Dept: SURGERY | Facility: CLINIC | Age: 70
End: 2025-03-26
Payer: COMMERCIAL

## 2025-03-30 LAB
25(OH)D3+25(OH)D2 SERPL-MCNC: 51 NG/ML (ref 30–100)
ALBUMIN SERPL-MCNC: 3.9 G/DL (ref 3.6–5.1)
ALP SERPL-CCNC: 51 U/L (ref 37–153)
ALT SERPL-CCNC: 35 U/L (ref 6–29)
ANION GAP SERPL CALCULATED.4IONS-SCNC: 8 MMOL/L (CALC) (ref 7–17)
AST SERPL-CCNC: 28 U/L (ref 10–35)
BASOPHILS # BLD AUTO: 97 CELLS/UL (ref 0–200)
BASOPHILS NFR BLD AUTO: 1.2 %
BILIRUB SERPL-MCNC: 0.4 MG/DL (ref 0.2–1.2)
BUN SERPL-MCNC: 23 MG/DL (ref 7–25)
CALCIUM SERPL-MCNC: 9.2 MG/DL (ref 8.6–10.4)
CHLORIDE SERPL-SCNC: 102 MMOL/L (ref 98–110)
CO2 SERPL-SCNC: 29 MMOL/L (ref 20–32)
COPPER BLD-MCNC: 95 MCG/DL
CREAT SERPL-MCNC: 0.62 MG/DL (ref 0.5–1.05)
EGFRCR SERPLBLD CKD-EPI 2021: 98 ML/MIN/1.73M2
EOSINOPHIL # BLD AUTO: 130 CELLS/UL (ref 15–500)
EOSINOPHIL NFR BLD AUTO: 1.6 %
ERYTHROCYTE [DISTWIDTH] IN BLOOD BY AUTOMATED COUNT: 12.7 % (ref 11–15)
FERRITIN SERPL-MCNC: 54 NG/ML (ref 16–288)
FOLATE SERPL-MCNC: >24 NG/ML
GLUCOSE SERPL-MCNC: 77 MG/DL (ref 65–99)
HCT VFR BLD AUTO: 43.5 % (ref 35–45)
HGB BLD-MCNC: 14.5 G/DL (ref 11.7–15.5)
IRON SATN MFR SERPL: 24 % (CALC) (ref 16–45)
IRON SERPL-MCNC: 69 MCG/DL (ref 45–160)
LYMPHOCYTES # BLD AUTO: 2203 CELLS/UL (ref 850–3900)
LYMPHOCYTES NFR BLD AUTO: 27.2 %
MCH RBC QN AUTO: 30.2 PG (ref 27–33)
MCHC RBC AUTO-ENTMCNC: 33.3 G/DL (ref 32–36)
MCV RBC AUTO: 90.6 FL (ref 80–100)
MONOCYTES # BLD AUTO: 518 CELLS/UL (ref 200–950)
MONOCYTES NFR BLD AUTO: 6.4 %
NEUTROPHILS # BLD AUTO: 5152 CELLS/UL (ref 1500–7800)
NEUTROPHILS NFR BLD AUTO: 63.6 %
PLATELET # BLD AUTO: 285 THOUSAND/UL (ref 140–400)
PMV BLD REES-ECKER: 10.2 FL (ref 7.5–12.5)
POTASSIUM SERPL-SCNC: 4.1 MMOL/L (ref 3.5–5.3)
PROT SERPL-MCNC: 6.7 G/DL (ref 6.1–8.1)
PTH-INTACT SERPL-MCNC: 18 PG/ML (ref 16–77)
RBC # BLD AUTO: 4.8 MILLION/UL (ref 3.8–5.1)
SODIUM SERPL-SCNC: 139 MMOL/L (ref 135–146)
TIBC SERPL-MCNC: 293 MCG/DL (CALC) (ref 250–450)
VIT B1 BLD-SCNC: 307 NMOL/L (ref 78–185)
VIT B12 SERPL-MCNC: 754 PG/ML (ref 200–1100)
WBC # BLD AUTO: 8.1 THOUSAND/UL (ref 3.8–10.8)
ZINC SERPL-MCNC: 75 MCG/DL (ref 60–130)

## 2025-03-31 ENCOUNTER — APPOINTMENT (OUTPATIENT)
Dept: SURGERY | Facility: CLINIC | Age: 70
End: 2025-03-31
Payer: MEDICARE

## 2025-03-31 VITALS — BODY MASS INDEX: 50.38 KG/M2 | WEIGHT: 240 LBS | HEIGHT: 58 IN

## 2025-03-31 DIAGNOSIS — I10 ESSENTIAL HYPERTENSION: ICD-10-CM

## 2025-03-31 DIAGNOSIS — E55.9 VITAMIN D DEFICIENCY: ICD-10-CM

## 2025-03-31 DIAGNOSIS — E78.49 OTHER HYPERLIPIDEMIA: ICD-10-CM

## 2025-03-31 DIAGNOSIS — E21.3 HYPERPARATHYROIDISM (MULTI): ICD-10-CM

## 2025-03-31 DIAGNOSIS — E53.8 VITAMIN B12 DEFICIENCY: ICD-10-CM

## 2025-03-31 DIAGNOSIS — K90.9 INTESTINAL MALABSORPTION, UNSPECIFIED TYPE (HHS-HCC): Primary | ICD-10-CM

## 2025-03-31 DIAGNOSIS — Z98.84 S/P LAPAROSCOPIC SLEEVE GASTRECTOMY: ICD-10-CM

## 2025-03-31 DIAGNOSIS — E61.1 IRON DEFICIENCY: ICD-10-CM

## 2025-03-31 PROCEDURE — 1159F MED LIST DOCD IN RCRD: CPT

## 2025-03-31 PROCEDURE — 99214 OFFICE O/P EST MOD 30 MIN: CPT

## 2025-03-31 PROCEDURE — 1036F TOBACCO NON-USER: CPT

## 2025-03-31 PROCEDURE — 3008F BODY MASS INDEX DOCD: CPT

## 2025-03-31 NOTE — PROGRESS NOTES
Bariatric Post-Op Follow Up Nutrition Assessment    Surgery Date: 3/20/24   Surgeon: Dr. Barry   Procedure: VSG     ASSESSMENT:  Current weight: 240 lbs Ht: 58.25 in. BMI: 50.16  Previous weight: 242 lbs   Initial start weight: 320 lbs   EBW: 195 lbs  Total weight change: 80 lbs   %EBW Lost: 41    PROGRESS:  Nutrition Interventions from last encounter:   1. Continue to follow the lifelong rules.     CHANGES IN TREATMENT:   Patient met goals: Yes  Diet recall: 3 meals/day   Breakfast: 1 egg with fat-free cheese, or 6 egg, sausage and hudson bites, or fat-free cottage cheese mixed with fruit   Lunch: a protein shake and a P3 snack pack with lunch meat and cheese   Dinner: mostly just a protein - turkey, chicken, fish, eggs, shrimp occasionally   Snacks: none   Volume size per meal: estimated ~3 oz   Beverages: just water, decaf coffee and protein shake  Alcohol: not addressed     Vitamins: Celebrate 45 MVI, calcium citrate BID    Physical Activity: walking daily     READINESS TO LEARN:  Motivation to learn: Interested      Understanding of instruction: Good   Anticipated Compliance: Good  Family Support: Unable to assess-family not present     Patient presents with post-op weight loss surgery. Patient has lost 80 pounds since initial assessment accounting for 41% loss excess body weight. Pt is tolerating a regular diet without difficulty. Protein intake is adequate for post-op individual. Fluid consumption is adequate. Patient is supplementing recommended vitamin/minerals. Pt states concerns/difficulties with further weight loss. She reports being at a weight stall for the past few months. Nisa reports feeling overwhelmed with trying to manage calories, carbohydrates, sugar, fat and fiber intake. We reviewed the breakdown of nutrition labels and I recommended that Alanna keep it simple. We reviewed the negatives of being too restrictive with diet. We also reviewed how potentially restricting could be limiting weight  "loss. I explored the possibility of Alanna seeking therapy - however she reports she will think about it more.     Nutrition Diagnosis:   1. Increased protein and nutrition needs related to altered GI function as evidenced by pt. s/p gastric bypass.  2. Food- and nutrition-related knowledge deficit related to lack of prior exposure to surgical weight loss information as evidenced by diet recall.     Nutrition Interventions:   1. Modify type and amount of food and nutrients within meals and snacks.  2. Comprehensive Nutrition Education    Recommendations:    1. Eat 60-70 g of protein per day. Try to break up 3 meals/day into smaller, more frequent portions. Also recommended that Alanna increase daily caloric intake through protein and high fiber foods.   2. Drink 64 fl oz. per day  3. Continue to follow the \"30-30-30 rule\".   4. Increase intensity and duration of exercise  5. Continue current vit/min regimen  6. Optional: attend monthly support groups as needed     Nutrition Monitoring and Evaluation:   1-2 pounds weight loss per week  Criteria: weight check, food recall  Need for Follow-up: in 6 months, or sooner as needed per pt.       Keiry Herndon RD, LDN  Bariatric Dietitian  [phone]  "

## 2025-03-31 NOTE — PROGRESS NOTES
Subjective   Patient ID: Nisa Guzman is a 69 y.o. female who presents for No chief complaint on file..  ABDULAZIZ Paula is here for her 1 year follow up after a VSG. She has lost 41% of her excess weight. She denies reflux. She stopped her omeprazole after her last appointment and has not had any problems. She is frustrated with her weight loss. She tells me that she eats mostly lean protein, she does not eat starch. She will feel full with a turkey burger with no bun. Her meal volume is about the same as her last appointment. She is following the rules. She walks every day for exercise. She is walking her block everyday, which takes her about 40min.  She is currently taking celebrate 45, calcium citrate BID, biotin, and vitamin complex. She had labwork for this appointment, which we reviewed.       Objective   Physical Exam  Constitutional:       Appearance: Normal appearance.   Eyes:      Pupils: Pupils are equal, round, and reactive to light.   Cardiovascular:      Rate and Rhythm: Normal rate.   Pulmonary:      Effort: Pulmonary effort is normal.   Abdominal:      Palpations: Abdomen is soft.   Musculoskeletal:         General: Normal range of motion.   Skin:     General: Skin is warm and dry.   Neurological:      General: No focal deficit present.      Mental Status: She is alert and oriented to person, place, and time.   Psychiatric:         Mood and Affect: Mood normal.        Latest Reference Range & Units 03/21/25 14:19   GLUCOSE 65 - 99 mg/dL 77   SODIUM 135 - 146 mmol/L 139   POTASSIUM 3.5 - 5.3 mmol/L 4.1   CHLORIDE 98 - 110 mmol/L 102   CARBON DIOXIDE 20 - 32 mmol/L 29   ELECTROLYTE BALANCE 7 - 17 mmol/L (calc) 8   UREA NITROGEN (BUN) 7 - 25 mg/dL 23   CREATININE 0.50 - 1.05 mg/dL 0.62   EGFR > OR = 60 mL/min/1.73m2 98   CALCIUM 8.6 - 10.4 mg/dL 9.2   ALBUMIN 3.6 - 5.1 g/dL 3.9   PROTEIN, TOTAL 6.1 - 8.1 g/dL 6.7   ALKALINE PHOSPHATASE 37 - 153 U/L 51   ALT 6 - 29 U/L 35 (H)   AST 10 - 35 U/L 28    BILIRUBIN, TOTAL 0.2 - 1.2 mg/dL 0.4   FERRITIN 16 - 288 ng/mL 54   FOLATE, SERUM ng/mL >24.0   IRON, TOTAL 45 - 160 mcg/dL 69   IRON BINDING CAPACITY 250 - 450 mcg/dL (calc) 293   % SATURATION 16 - 45 % (calc) 24   VITAMIN B12 200 - 1,100 pg/mL 754   COPPER, BLOOD mcg/dL 95   ZINC 60 - 130 mcg/dL 75   VITAMIN B1 (THIAMINE), BLOOD, LC/MS/MS 78 - 185 nmol/L 307 (H)   PARATHYROID HORMONE, INTACT 16 - 77 pg/mL 18   VITAMIN D,25-OH,TOTAL,IA 30 - 100 ng/mL 51   WHITE BLOOD CELL COUNT 3.8 - 10.8 Thousand/uL 8.1   RED BLOOD CELL COUNT 3.80 - 5.10 Million/uL 4.80   HEMOGLOBIN 11.7 - 15.5 g/dL 14.5   HEMATOCRIT 35.0 - 45.0 % 43.5   MCV 80.0 - 100.0 fL 90.6   MCH 27.0 - 33.0 pg 30.2   MCHC 32.0 - 36.0 g/dL 33.3   RDW 11.0 - 15.0 % 12.7   PLATELET COUNT 140 - 400 Thousand/uL 285   MPV 7.5 - 12.5 fL 10.2   ABSOLUTE NEUTROPHILS 1,500 - 7,800 cells/uL 5,152   ABSOLUTE LYMPHOCYTES 850 - 3,900 cells/uL 2,203   ABSOLUTE MONOCYTES 200 - 950 cells/uL 518   ABSOLUTE EOSINOPHILS 15 - 500 cells/uL 130   ABSOLUTE BASOPHILS 0 - 200 cells/uL 97   NEUTROPHILS % 63.6   LYMPHOCYTES % 27.2   MONOCYTES % 6.4   EOSINOPHILS % 1.6   BASOPHILS % 1.2     Assessment/Plan   Problem List Items Addressed This Visit             ICD-10-CM    Essential hypertension I10    Relevant Orders    CBC and Auto Differential    Comprehensive Metabolic Panel    Copper, Blood    Ferritin    Folate    Iron and TIBC    Parathyroid Hormone, Intact    Vitamin B1, Whole Blood    Vitamin B12    Vitamin D 25-Hydroxy,Total (for eval of Vitamin D levels)    Zinc, Serum or Plasma    Other hyperlipidemia E78.49    Relevant Orders    CBC and Auto Differential    Comprehensive Metabolic Panel    Copper, Blood    Ferritin    Folate    Iron and TIBC    Parathyroid Hormone, Intact    Vitamin B1, Whole Blood    Vitamin B12    Vitamin D 25-Hydroxy,Total (for eval of Vitamin D levels)    Zinc, Serum or Plasma    Intestinal malabsorption - Primary K90.9    Relevant Orders    CBC and  Auto Differential    Comprehensive Metabolic Panel    Copper, Blood    Ferritin    Folate    Iron and TIBC    Parathyroid Hormone, Intact    Vitamin B1, Whole Blood    Vitamin B12    Vitamin D 25-Hydroxy,Total (for eval of Vitamin D levels)    Zinc, Serum or Plasma    Vitamin D deficiency E55.9    Relevant Orders    Vitamin D 25-Hydroxy,Total (for eval of Vitamin D levels)    Vitamin B12 deficiency E53.8    Relevant Orders    Vitamin B12    Hyperparathyroidism (Multi) E21.3    Relevant Orders    Parathyroid Hormone, Intact    S/P laparoscopic sleeve gastrectomy Z98.84    Iron deficiency E61.1    Relevant Orders    Comprehensive Metabolic Panel    Ferritin    Iron and TIBC     Alanna and I reviewed the lifelong rules. We discussed ensuring she is hitting her protein goal at each meal. We discussed adding resistance training to her exercise routine. She will continue her current supplementation. We discussed that she would not be a candidate for a hernia repair at her current BMI. We discussed that at her next appointment, we can reevaluate. She will follow up in about 6 months with repeat labwork.     Fang Magana, SELENA-CNP 03/31/25 1:00 PM

## 2025-04-11 ENCOUNTER — TELEPHONE (OUTPATIENT)
Dept: SURGERY | Facility: CLINIC | Age: 70
End: 2025-04-11

## 2025-06-30 DIAGNOSIS — E78.49 OTHER HYPERLIPIDEMIA: ICD-10-CM

## 2025-06-30 DIAGNOSIS — I10 ESSENTIAL HYPERTENSION: ICD-10-CM

## 2025-06-30 DIAGNOSIS — E53.8 VITAMIN B12 DEFICIENCY: ICD-10-CM

## 2025-06-30 DIAGNOSIS — E61.1 IRON DEFICIENCY: ICD-10-CM

## 2025-06-30 DIAGNOSIS — K90.9 INTESTINAL MALABSORPTION, UNSPECIFIED TYPE (HHS-HCC): ICD-10-CM

## 2025-06-30 DIAGNOSIS — E55.9 VITAMIN D DEFICIENCY: ICD-10-CM

## 2025-06-30 DIAGNOSIS — E21.3 HYPERPARATHYROIDISM (MULTI): ICD-10-CM

## (undated) DEVICE — SOLUTION, IRRIGATION, X RX SODIUM CHL 0.9%, 1000ML BTL

## (undated) DEVICE — WATER STERILE, FOR IRRIGATION, 1000ML, W/HANGER

## (undated) DEVICE — DRESSING, MEPILEX BORDER, POST-OP AG, 3.5 X 4 IN

## (undated) DEVICE — TROCAR, TITAN SGS, STANDARD 19MM

## (undated) DEVICE — SUTURE, VICRYL, 2-0, 27 IN, BR/SH 27, VIOLET

## (undated) DEVICE — SPONGE, HEMOSTATIC, CELLULOSE, SURGICEL, 4 X 8 IN

## (undated) DEVICE — APPLICATOR, CHLORAPREP, W/ORANGE TINT, 26ML

## (undated) DEVICE — CLIP, ENDO APPLIER LIGAMAX 5MM

## (undated) DEVICE — GLOVE, SURGICAL, PROTEXIS PI , 7.0, PF, LF

## (undated) DEVICE — SHEARS, HARMONIC CURVED XLONG 45CM

## (undated) DEVICE — SOLUTION, INJECTION, USP, LACTATED RINGERS, LIFECARE, 1000ML

## (undated) DEVICE — TUBE SET, PNEUMOLAR HEATED, SMOKE EVACU, HIGH-FLOW

## (undated) DEVICE — SCISSORS, HOOK, 5MM X 31CM, DISP

## (undated) DEVICE — CLIP, ABSORBABLE, VICRYL, LAPRA-TY, VIOLET

## (undated) DEVICE — TROCAR, KII OPTICAL SEPARATOR, 8MM X 100MM,  Z-THREAD

## (undated) DEVICE — SOLUTION, INJECTION, 0.9% SODIUM CHL, USP LIFECARE 1000 MI

## (undated) DEVICE — SUTURE, VICRYL, 0, SH 27 TAPER NEEDLE, UNDYED, BRAIDED

## (undated) DEVICE — Device

## (undated) DEVICE — DRAPE, SHEET, UTILITY, NON ABSORBENT, 18 X 26 IN, LF

## (undated) DEVICE — SUTURE, VICRYL 0, 36 IN, CT-1, VIOLET

## (undated) DEVICE — GLOVE, SURGICAL, PROTEXIS PI , 6.5, PF, LF

## (undated) DEVICE — GLOVE, SURGICAL, PROTEXIS PI BLUE W/NEUTHERA, 6.5, PF, LF

## (undated) DEVICE — IRRIGATOR, WOUND, HYDRO SURG PLUS, W/O TIP, DISP

## (undated) DEVICE — TROCAR, KII OPTICAL BLADELESS 5MM Z THREAD 100MM LNGTH

## (undated) DEVICE — MARKER, SKIN, VISFLOW BOLD TIP, W/RULER

## (undated) DEVICE — SUTURE, ETHIBOND XTRA, 0, SHSH, 36IN, LF

## (undated) DEVICE — SLEEVE, KII, Z-THREAD, 12X100CM

## (undated) DEVICE — STAPLER, TITAN SGS RB, 23 CM

## (undated) DEVICE — ENDOPATH, XCEL, 5MM X 100MM, FOR BLADELESS TROCAR

## (undated) DEVICE — TROCAR, ENDOPATH EXCEL, 5MM, BLADELESS

## (undated) DEVICE — TROCAR, OPTICAL, BLADELESS, 12MM, THREADED, 100MM LENGTH